# Patient Record
Sex: FEMALE | Race: OTHER | HISPANIC OR LATINO | URBAN - METROPOLITAN AREA
[De-identification: names, ages, dates, MRNs, and addresses within clinical notes are randomized per-mention and may not be internally consistent; named-entity substitution may affect disease eponyms.]

---

## 2017-01-10 ENCOUNTER — INPATIENT (INPATIENT)
Facility: HOSPITAL | Age: 37
LOS: 2 days | Discharge: HOME | DRG: 342 | End: 2017-01-13
Attending: SURGERY | Admitting: SURGERY
Payer: MEDICAID

## 2017-01-10 VITALS — HEIGHT: 59 IN | WEIGHT: 125 LBS

## 2017-01-10 LAB
ALBUMIN SERPL ELPH-MCNC: 3.8 G/DL — SIGNIFICANT CHANGE UP (ref 3.3–5)
ALP SERPL-CCNC: 98 U/L — SIGNIFICANT CHANGE UP (ref 40–120)
ALT FLD-CCNC: 29 U/L RC — SIGNIFICANT CHANGE UP (ref 10–45)
ANION GAP SERPL CALC-SCNC: 18 MMOL/L — HIGH (ref 5–17)
APPEARANCE UR: ABNORMAL
APTT BLD: 35.7 SEC — SIGNIFICANT CHANGE UP (ref 27.5–37.4)
AST SERPL-CCNC: 40 U/L — SIGNIFICANT CHANGE UP (ref 10–40)
BACTERIA # UR AUTO: ABNORMAL /HPF
BASOPHILS # BLD AUTO: 0 K/UL — SIGNIFICANT CHANGE UP (ref 0–0.2)
BASOPHILS NFR BLD AUTO: 0.1 % — SIGNIFICANT CHANGE UP (ref 0–2)
BILIRUB SERPL-MCNC: 0.5 MG/DL — SIGNIFICANT CHANGE UP (ref 0.2–1.2)
BILIRUB UR-MCNC: NEGATIVE — SIGNIFICANT CHANGE UP
BUN SERPL-MCNC: 10 MG/DL — SIGNIFICANT CHANGE UP (ref 7–23)
CALCIUM SERPL-MCNC: 9.4 MG/DL — SIGNIFICANT CHANGE UP (ref 8.4–10.5)
CHLORIDE SERPL-SCNC: 97 MMOL/L — SIGNIFICANT CHANGE UP (ref 96–108)
CO2 SERPL-SCNC: 22 MMOL/L — SIGNIFICANT CHANGE UP (ref 22–31)
COLOR SPEC: YELLOW — SIGNIFICANT CHANGE UP
CREAT SERPL-MCNC: 0.77 MG/DL — SIGNIFICANT CHANGE UP (ref 0.5–1.3)
DIFF PNL FLD: ABNORMAL
EOSINOPHIL # BLD AUTO: 0.3 K/UL — SIGNIFICANT CHANGE UP (ref 0–0.5)
EOSINOPHIL NFR BLD AUTO: 1.1 % — SIGNIFICANT CHANGE UP (ref 0–6)
EPI CELLS # UR: SIGNIFICANT CHANGE UP /HPF
GAS PNL BLDV: SIGNIFICANT CHANGE UP
GLUCOSE SERPL-MCNC: 134 MG/DL — HIGH (ref 70–99)
GLUCOSE UR QL: NEGATIVE — SIGNIFICANT CHANGE UP
HCG SERPL-ACNC: <2 MIU/ML — SIGNIFICANT CHANGE UP
HCG UR QL: NEGATIVE — SIGNIFICANT CHANGE UP
HCT VFR BLD CALC: 34.5 % — SIGNIFICANT CHANGE UP (ref 34.5–45)
HGB BLD-MCNC: 11.6 G/DL — SIGNIFICANT CHANGE UP (ref 11.5–15.5)
INR BLD: 1.38 RATIO — HIGH (ref 0.88–1.16)
KETONES UR-MCNC: NEGATIVE — SIGNIFICANT CHANGE UP
LEUKOCYTE ESTERASE UR-ACNC: ABNORMAL
LYMPHOCYTES # BLD AUTO: 1.5 K/UL — SIGNIFICANT CHANGE UP (ref 1–3.3)
LYMPHOCYTES # BLD AUTO: 6 % — LOW (ref 13–44)
MCHC RBC-ENTMCNC: 25.9 PG — LOW (ref 27–34)
MCHC RBC-ENTMCNC: 33.6 GM/DL — SIGNIFICANT CHANGE UP (ref 32–36)
MCV RBC AUTO: 77.1 FL — LOW (ref 80–100)
MONOCYTES # BLD AUTO: 1.6 K/UL — HIGH (ref 0–0.9)
MONOCYTES NFR BLD AUTO: 6.5 % — SIGNIFICANT CHANGE UP (ref 2–14)
NEUTROPHILS # BLD AUTO: 21.8 K/UL — HIGH (ref 1.8–7.4)
NEUTROPHILS NFR BLD AUTO: 86.2 % — HIGH (ref 43–77)
NITRITE UR-MCNC: POSITIVE
PH UR: 6 — SIGNIFICANT CHANGE UP (ref 4.8–8)
PLATELET # BLD AUTO: 270 K/UL — SIGNIFICANT CHANGE UP (ref 150–400)
POTASSIUM SERPL-MCNC: 3.8 MMOL/L — SIGNIFICANT CHANGE UP (ref 3.5–5.3)
POTASSIUM SERPL-SCNC: 3.8 MMOL/L — SIGNIFICANT CHANGE UP (ref 3.5–5.3)
PROT SERPL-MCNC: 7.8 G/DL — SIGNIFICANT CHANGE UP (ref 6–8.3)
PROT UR-MCNC: 30 MG/DL
PROTHROM AB SERPL-ACNC: 15.1 SEC — HIGH (ref 10–13.1)
RBC # BLD: 4.48 M/UL — SIGNIFICANT CHANGE UP (ref 3.8–5.2)
RBC # FLD: 15.5 % — HIGH (ref 10.3–14.5)
RBC CASTS # UR COMP ASSIST: SIGNIFICANT CHANGE UP /HPF (ref 0–2)
SODIUM SERPL-SCNC: 137 MMOL/L — SIGNIFICANT CHANGE UP (ref 135–145)
SP GR SPEC: 1.02 — SIGNIFICANT CHANGE UP (ref 1.01–1.02)
UROBILINOGEN FLD QL: NEGATIVE — SIGNIFICANT CHANGE UP
WBC # BLD: 25.3 K/UL — HIGH (ref 3.8–10.5)
WBC # FLD AUTO: 25.3 K/UL — HIGH (ref 3.8–10.5)
WBC UR QL: SIGNIFICANT CHANGE UP /HPF (ref 0–5)

## 2017-01-10 PROCEDURE — 99285 EMERGENCY DEPT VISIT HI MDM: CPT

## 2017-01-10 PROCEDURE — 74177 CT ABD & PELVIS W/CONTRAST: CPT | Mod: 26

## 2017-01-10 PROCEDURE — 71010: CPT | Mod: 26

## 2017-01-10 RX ORDER — ACETAMINOPHEN 500 MG
1000 TABLET ORAL ONCE
Refills: 0 | Status: COMPLETED | OUTPATIENT
Start: 2017-01-10 | End: 2017-01-10

## 2017-01-10 RX ORDER — ONDANSETRON 8 MG/1
4 TABLET, FILM COATED ORAL ONCE
Refills: 0 | Status: COMPLETED | OUTPATIENT
Start: 2017-01-10 | End: 2017-01-10

## 2017-01-10 RX ORDER — MORPHINE SULFATE 50 MG/1
4 CAPSULE, EXTENDED RELEASE ORAL ONCE
Refills: 0 | Status: DISCONTINUED | OUTPATIENT
Start: 2017-01-10 | End: 2017-01-10

## 2017-01-10 RX ORDER — SODIUM CHLORIDE 9 MG/ML
1000 INJECTION INTRAMUSCULAR; INTRAVENOUS; SUBCUTANEOUS ONCE
Refills: 0 | Status: COMPLETED | OUTPATIENT
Start: 2017-01-10 | End: 2017-01-10

## 2017-01-10 RX ORDER — CEFTRIAXONE 500 MG/1
1 INJECTION, POWDER, FOR SOLUTION INTRAMUSCULAR; INTRAVENOUS ONCE
Refills: 0 | Status: COMPLETED | OUTPATIENT
Start: 2017-01-10 | End: 2017-01-10

## 2017-01-10 RX ADMIN — SODIUM CHLORIDE 1000 MILLILITER(S): 9 INJECTION INTRAMUSCULAR; INTRAVENOUS; SUBCUTANEOUS at 20:44

## 2017-01-10 RX ADMIN — MORPHINE SULFATE 4 MILLIGRAM(S): 50 CAPSULE, EXTENDED RELEASE ORAL at 21:30

## 2017-01-10 RX ADMIN — MORPHINE SULFATE 4 MILLIGRAM(S): 50 CAPSULE, EXTENDED RELEASE ORAL at 20:46

## 2017-01-10 RX ADMIN — Medication 400 MILLIGRAM(S): at 21:29

## 2017-01-10 RX ADMIN — CEFTRIAXONE 100 GRAM(S): 500 INJECTION, POWDER, FOR SOLUTION INTRAMUSCULAR; INTRAVENOUS at 21:58

## 2017-01-10 RX ADMIN — ONDANSETRON 4 MILLIGRAM(S): 8 TABLET, FILM COATED ORAL at 20:45

## 2017-01-10 NOTE — ED PROVIDER NOTE - ATTENDING CONTRIBUTION TO CARE
Patient presenting c/o multiple days of worsening abdominal pain and fevers.  Associated nausea, decreased appetite.  No prior surgeries.  Localizing pain primarily to RLQ.    On exam patient tachycardic, febrile, unwell but non toxic appearing, lungs clear.  Abdomen TTP RUQ/RLQ/LLQ, + rebound, + R CVA TTP.    DDx:  Appendicitis/pyelonephritis/infected renal stone - labs, antipyretics, IVF, pain control, UA, CT A/P

## 2017-01-10 NOTE — ED PROVIDER NOTE - OBJECTIVE STATEMENT
40y Female no PMH no PSH on abdomen complaining of RLQ abdominal pain x 4 days. Starting Friday, started having abdominal pain. Associated with vomiting and constipation.  Also having back pain. Pain worsens with movement. +tactile fevers since saturday. Feeling generally weak. Has been preventing her from working, caused her to present here today. Denies diarrhea, chest pain, or dyspnea.     LMP: 12/16/2017 40y Female no PMH no PSH on abdomen complaining of RLQ/RUQ abdominal pain +R flank pain x 4 days. Starting Friday, started having abdominal pain. Associated with vomiting and constipation.  Also having back pain, bilateral upper and Right lower back. Pain worsens with movement. +tactile fevers since saturday. Feeling generally weak. Has been preventing her from working, caused her to present here today. Denies diarrhea, chest pain, or dyspnea. +dysuria.    LMP: 12/16/2017 40y Female no PMH no PSH on abdomen complaining of RLQ/RUQ abdominal pain +R flank pain x 4 days. Starting Friday, started having abdominal pain. Associated with vomiting and constipation.  Also having back pain, bilateral upper and Right lower back. Pain worsens with movement. +tactile fevers since saturday. Feeling generally weak. Has been preventing her from working, caused her to present here today. Denies diarrhea, chest pain, or dyspnea. +dysuria.    no PCP  LMP: 12/16/2017

## 2017-01-10 NOTE — ED ADULT NURSE NOTE - OBJECTIVE STATEMENT
39 y/o female pt presents to ED c/o RLQ abd pain radiating to R lower back x 5 days. Pt states vomited yesterday, has not vomited today, +nausea, +dysuria, states has seen "pus" in urine. +Chills, febrile rectally in ED. States last BM was five days ago, normal for pt, denies diarrhea. No hx of abd surgeries. Abd tender to palpation in RLQ, nondistended, skin warm dry and intact. Pt in no acute distress.

## 2017-01-10 NOTE — ED PROVIDER NOTE - PHYSICAL EXAMINATION
ROS: GENERAL: no fevers, no chills HEENT: no epistaxis, no eye pain, no ear, no throat pain CARDIAC: no chest pain, no shortness of breath PULM: no cough, no shortness of breath GI: no nausea, no vomiting, no diarrhea, no abdominal pain, no hematemesis, no bright red blood per rectum : no dysuria, no hematuria EXTREMITIES: no arm pain, no leg pain, no back pain SKIN: no purpura, no petechiae NEURO: no headache, no neck pain, no loss of strength/sensation HEME: no easy bruising, no easy bleeding     PE: CONSTITUTIONAL: Well appearing, well nourished, in no apparent distress. ENMT: Airway patent, nasal mucosa clear, mouth with normal mucosa. HEAD: NCAT EYES: PERRL, EOMI CARDIAC: RRR, no m/r/g, no pedal edema RESPIRATORY: CTA b/l, no adventitious sounds GI: Abdomen non-distended, +TTP of RLQ MSK: Spine appears normal, range of motion is not limited, no muscle/joint tenderness NEURO: CNII-XII grossly intact, 5/5 strength, full sensation all extremities, gait intact SKIN: Skin tone normal in color, warm and dry. No evidence of rash. ROS: GENERAL: + fevers, no chills HEENT: no epistaxis, no eye pain, no ear, no throat pain CARDIAC: no chest pain, no shortness of breath PULM: no cough, no shortness of breath GI: + nausea, + vomiting, no diarrhea, +constipation, + abdominal pain, no hematemesis, no bright red blood per rectum : + dysuria, no hematuria EXTREMITIES: no arm pain, no leg pain, + back pain, +flank pain SKIN: no purpura, no petechiae NEURO: no headache, no neck pain, no loss of strength/sensation HEME: no easy bruising, no easy bleeding     PE: CONSTITUTIONAL: Nontoxic, in mild apparent distress. ENMT: Airway patent, nasal mucosa clear, mouth with normal mucosa. HEAD: NCAT EYES: PERRL, EOMI CARDIAC: RRR, no m/r/g, no pedal edema RESPIRATORY: CTA b/l, no adventitious sounds GI: Abdomen non-distended, +TTP of RLQ, RUQ, +rebound TTP, +Pérez's sign, +R CVAT MSK: Spine appears normal, range of motion is not limited, no muscle/joint tenderness NEURO: CNII-XII grossly intact, 5/5 strength, full sensation all extremities, gait intact SKIN: Skin tone normal in color, warm and dry. No evidence of rash. ROS: GENERAL: + fevers, no chills HEENT: no epistaxis, no eye pain, no ear, no throat pain CARDIAC: no chest pain, no shortness of breath PULM: no cough, no shortness of breath GI: + nausea, + vomiting, no diarrhea, +constipation, + abdominal pain, no hematemesis, no bright red blood per rectum : + dysuria, no hematuria EXTREMITIES: no arm pain, no leg pain, + back pain, +flank pain SKIN: no purpura, no petechiae NEURO: no headache, no neck pain, no loss of strength/sensation HEME: no easy bruising, no easy bleeding     PE: CONSTITUTIONAL: Nontoxic, in mild apparent distress. ENMT: Airway patent, nasal mucosa clear, mouth with normal mucosa. HEAD: NCAT EYES: PERRL, EOMI CARDIAC: RRR, no m/r/g, no pedal edema RESPIRATORY: CTA b/l, no adventitious sounds GI: Abdomen distended, +TTP of RLQ, RUQ, +rebound TTP, +Pérez's sign, +R CVAT MSK: Spine appears normal, range of motion is not limited, +bilateral upper back pain NEURO: CNII-XII grossly intact, 5/5 strength, full sensation all extremities, gait intact SKIN: Skin tone normal in color, warm and dry. No evidence of rash.

## 2017-01-11 DIAGNOSIS — Z98.891 HISTORY OF UTERINE SCAR FROM PREVIOUS SURGERY: Chronic | ICD-10-CM

## 2017-01-11 DIAGNOSIS — R10.9 UNSPECIFIED ABDOMINAL PAIN: ICD-10-CM

## 2017-01-11 LAB — RH IG SCN BLD-IMP: POSITIVE — SIGNIFICANT CHANGE UP

## 2017-01-11 PROCEDURE — 88304 TISSUE EXAM BY PATHOLOGIST: CPT | Mod: 26

## 2017-01-11 PROCEDURE — 78227 HEPATOBIL SYST IMAGE W/DRUG: CPT | Mod: 26

## 2017-01-11 PROCEDURE — 44970 LAPAROSCOPY APPENDECTOMY: CPT | Mod: 59

## 2017-01-11 PROCEDURE — 47562 LAPAROSCOPIC CHOLECYSTECTOMY: CPT

## 2017-01-11 RX ORDER — MORPHINE SULFATE 50 MG/1
4 CAPSULE, EXTENDED RELEASE ORAL EVERY 4 HOURS
Refills: 0 | Status: DISCONTINUED | OUTPATIENT
Start: 2017-01-11 | End: 2017-01-11

## 2017-01-11 RX ORDER — PIPERACILLIN AND TAZOBACTAM 4; .5 G/20ML; G/20ML
3.38 INJECTION, POWDER, LYOPHILIZED, FOR SOLUTION INTRAVENOUS ONCE
Refills: 0 | Status: COMPLETED | OUTPATIENT
Start: 2017-01-11 | End: 2017-01-11

## 2017-01-11 RX ORDER — HEPARIN SODIUM 5000 [USP'U]/ML
5000 INJECTION INTRAVENOUS; SUBCUTANEOUS EVERY 8 HOURS
Refills: 0 | Status: DISCONTINUED | OUTPATIENT
Start: 2017-01-11 | End: 2017-01-13

## 2017-01-11 RX ORDER — SODIUM CHLORIDE 9 MG/ML
1000 INJECTION, SOLUTION INTRAVENOUS
Refills: 0 | Status: DISCONTINUED | OUTPATIENT
Start: 2017-01-11 | End: 2017-01-12

## 2017-01-11 RX ORDER — PIPERACILLIN AND TAZOBACTAM 4; .5 G/20ML; G/20ML
3.38 INJECTION, POWDER, LYOPHILIZED, FOR SOLUTION INTRAVENOUS EVERY 8 HOURS
Refills: 0 | Status: COMPLETED | OUTPATIENT
Start: 2017-01-11 | End: 2017-01-12

## 2017-01-11 RX ORDER — HYDROMORPHONE HYDROCHLORIDE 2 MG/ML
0.5 INJECTION INTRAMUSCULAR; INTRAVENOUS; SUBCUTANEOUS
Refills: 0 | Status: DISCONTINUED | OUTPATIENT
Start: 2017-01-11 | End: 2017-01-11

## 2017-01-11 RX ORDER — ACETAMINOPHEN 500 MG
975 TABLET ORAL ONCE
Refills: 0 | Status: COMPLETED | OUTPATIENT
Start: 2017-01-11 | End: 2017-01-11

## 2017-01-11 RX ORDER — PIPERACILLIN AND TAZOBACTAM 4; .5 G/20ML; G/20ML
3.38 INJECTION, POWDER, LYOPHILIZED, FOR SOLUTION INTRAVENOUS EVERY 8 HOURS
Refills: 0 | Status: DISCONTINUED | OUTPATIENT
Start: 2017-01-11 | End: 2017-01-11

## 2017-01-11 RX ORDER — ENOXAPARIN SODIUM 100 MG/ML
40 INJECTION SUBCUTANEOUS DAILY
Refills: 0 | Status: DISCONTINUED | OUTPATIENT
Start: 2017-01-11 | End: 2017-01-11

## 2017-01-11 RX ORDER — SODIUM CHLORIDE 9 MG/ML
1000 INJECTION, SOLUTION INTRAVENOUS
Refills: 0 | Status: DISCONTINUED | OUTPATIENT
Start: 2017-01-11 | End: 2017-01-11

## 2017-01-11 RX ORDER — ONDANSETRON 8 MG/1
4 TABLET, FILM COATED ORAL ONCE
Refills: 0 | Status: DISCONTINUED | OUTPATIENT
Start: 2017-01-11 | End: 2017-01-11

## 2017-01-11 RX ORDER — MORPHINE SULFATE 50 MG/1
2 CAPSULE, EXTENDED RELEASE ORAL
Refills: 0 | Status: DISCONTINUED | OUTPATIENT
Start: 2017-01-11 | End: 2017-01-12

## 2017-01-11 RX ORDER — MORPHINE SULFATE 50 MG/1
2 CAPSULE, EXTENDED RELEASE ORAL EVERY 4 HOURS
Refills: 0 | Status: DISCONTINUED | OUTPATIENT
Start: 2017-01-11 | End: 2017-01-11

## 2017-01-11 RX ADMIN — PIPERACILLIN AND TAZOBACTAM 25 GRAM(S): 4; .5 INJECTION, POWDER, LYOPHILIZED, FOR SOLUTION INTRAVENOUS at 06:10

## 2017-01-11 RX ADMIN — HEPARIN SODIUM 5000 UNIT(S): 5000 INJECTION INTRAVENOUS; SUBCUTANEOUS at 21:41

## 2017-01-11 RX ADMIN — Medication 975 MILLIGRAM(S): at 04:38

## 2017-01-11 RX ADMIN — PIPERACILLIN AND TAZOBACTAM 25 GRAM(S): 4; .5 INJECTION, POWDER, LYOPHILIZED, FOR SOLUTION INTRAVENOUS at 21:41

## 2017-01-11 RX ADMIN — PIPERACILLIN AND TAZOBACTAM 200 GRAM(S): 4; .5 INJECTION, POWDER, LYOPHILIZED, FOR SOLUTION INTRAVENOUS at 03:15

## 2017-01-11 RX ADMIN — SODIUM CHLORIDE 100 MILLILITER(S): 9 INJECTION, SOLUTION INTRAVENOUS at 18:08

## 2017-01-11 NOTE — BRIEF OPERATIVE NOTE - PROCEDURE
Umbilical hernia repair  01/11/2017    Active  AGODWIN  Appendectomy, laparoscopic  01/11/2017    Active  AGODWIN  Cholecystectomy, laparoscopic  01/11/2017    Active  AGODWIN

## 2017-01-11 NOTE — H&P ADULT. - ASSESSMENT
39 y/o female with abdominal pain with acute cholecystitis, with possible concomitant acute appendicitis    -Admit to Surgery, Dr. Davis  -NPO  -IVF  -Zosyn  -OOB  -HIDA to confirm acute cholecystitis   -DVT PPX

## 2017-01-11 NOTE — BRIEF OPERATIVE NOTE - OPERATION/FINDINGS
Patient underwent laparoscopic cholecystectomy without any complications. Gallbladder very inflamed, edematous, and gangrenous had to under go needle decompression before dissection began. Dense adhesion involving gallbladder, liver, and duodenum underwent careful adhesiolysis without any complications. Critical view obtained and cystic duct and artery identified then underwent clipping then divided. We then turned our attention to the appendix, which was also engorged and inflamed. Mesoappendix divided with ligasure electrocautery and appendix divided with EndoGIA (45 blue load). Patient also had primary repair of umbilical hernia.

## 2017-01-11 NOTE — BRIEF OPERATIVE NOTE - PRE-OP DX
Acute appendicitis with generalized peritonitis  01/11/2017    Chepe Craven  Calculus of gallbladder with acute cholecystitis without obstruction  01/11/2017    Active  Chepe Ruano

## 2017-01-11 NOTE — ED ADULT NURSE REASSESSMENT NOTE - NS ED NURSE REASSESS COMMENT FT1
received call from blood bank that type & screen was hemolyzed, pt in transport, receiving RN on 2 [facility] called and notified

## 2017-01-11 NOTE — BRIEF OPERATIVE NOTE - POST-OP DX
Calculus of gallbladder with acute cholecystitis without obstruction  01/11/2017    Chepe Craven  Other acute appendicitis  01/11/2017    Chepe Craven  Umbilical hernia without obstruction and without gangrene  01/11/2017    Chepe Craven

## 2017-01-12 LAB
-  AMIKACIN: SIGNIFICANT CHANGE UP
-  AMPICILLIN/SULBACTAM: SIGNIFICANT CHANGE UP
-  AMPICILLIN: SIGNIFICANT CHANGE UP
-  AZTREONAM: SIGNIFICANT CHANGE UP
-  CEFAZOLIN: SIGNIFICANT CHANGE UP
-  CEFEPIME: SIGNIFICANT CHANGE UP
-  CEFOXITIN: SIGNIFICANT CHANGE UP
-  CEFTAZIDIME: SIGNIFICANT CHANGE UP
-  CEFTRIAXONE: SIGNIFICANT CHANGE UP
-  CIPROFLOXACIN: SIGNIFICANT CHANGE UP
-  ERTAPENEM: SIGNIFICANT CHANGE UP
-  GENTAMICIN: SIGNIFICANT CHANGE UP
-  IMIPENEM: SIGNIFICANT CHANGE UP
-  LEVOFLOXACIN: SIGNIFICANT CHANGE UP
-  MEROPENEM: SIGNIFICANT CHANGE UP
-  NITROFURANTOIN: SIGNIFICANT CHANGE UP
-  PIPERACILLIN/TAZOBACTAM: SIGNIFICANT CHANGE UP
-  TOBRAMYCIN: SIGNIFICANT CHANGE UP
-  TRIMETHOPRIM/SULFAMETHOXAZOLE: SIGNIFICANT CHANGE UP
ALBUMIN SERPL ELPH-MCNC: 2.8 G/DL — LOW (ref 3.3–5)
ALP SERPL-CCNC: 158 U/L — HIGH (ref 40–120)
ALT FLD-CCNC: 31 U/L — SIGNIFICANT CHANGE UP (ref 10–45)
ANION GAP SERPL CALC-SCNC: 12 MMOL/L — SIGNIFICANT CHANGE UP (ref 5–17)
AST SERPL-CCNC: 34 U/L — SIGNIFICANT CHANGE UP (ref 10–40)
BILIRUB SERPL-MCNC: 0.2 MG/DL — SIGNIFICANT CHANGE UP (ref 0.2–1.2)
BUN SERPL-MCNC: 9 MG/DL — SIGNIFICANT CHANGE UP (ref 7–23)
CALCIUM SERPL-MCNC: 8 MG/DL — LOW (ref 8.4–10.5)
CHLORIDE SERPL-SCNC: 101 MMOL/L — SIGNIFICANT CHANGE UP (ref 96–108)
CO2 SERPL-SCNC: 26 MMOL/L — SIGNIFICANT CHANGE UP (ref 22–31)
CREAT SERPL-MCNC: 0.51 MG/DL — SIGNIFICANT CHANGE UP (ref 0.5–1.3)
CULTURE RESULTS: SIGNIFICANT CHANGE UP
GLUCOSE SERPL-MCNC: 114 MG/DL — HIGH (ref 70–99)
HCT VFR BLD CALC: 29.2 % — LOW (ref 34.5–45)
HGB BLD-MCNC: 9.3 G/DL — LOW (ref 11.5–15.5)
MCHC RBC-ENTMCNC: 24.8 PG — LOW (ref 27–34)
MCHC RBC-ENTMCNC: 31.8 GM/DL — LOW (ref 32–36)
MCV RBC AUTO: 77.9 FL — LOW (ref 80–100)
METHOD TYPE: SIGNIFICANT CHANGE UP
ORGANISM # SPEC MICROSCOPIC CNT: SIGNIFICANT CHANGE UP
ORGANISM # SPEC MICROSCOPIC CNT: SIGNIFICANT CHANGE UP
PLATELET # BLD AUTO: 301 K/UL — SIGNIFICANT CHANGE UP (ref 150–400)
POTASSIUM SERPL-MCNC: 4.1 MMOL/L — SIGNIFICANT CHANGE UP (ref 3.5–5.3)
POTASSIUM SERPL-SCNC: 4.1 MMOL/L — SIGNIFICANT CHANGE UP (ref 3.5–5.3)
PROT SERPL-MCNC: 6.3 G/DL — SIGNIFICANT CHANGE UP (ref 6–8.3)
RBC # BLD: 3.75 M/UL — LOW (ref 3.8–5.2)
RBC # FLD: 17.1 % — HIGH (ref 10.3–14.5)
SODIUM SERPL-SCNC: 139 MMOL/L — SIGNIFICANT CHANGE UP (ref 135–145)
SPECIMEN SOURCE: SIGNIFICANT CHANGE UP
WBC # BLD: 16.79 K/UL — HIGH (ref 3.8–10.5)
WBC # FLD AUTO: 16.79 K/UL — HIGH (ref 3.8–10.5)

## 2017-01-12 RX ORDER — SODIUM CHLORIDE 9 MG/ML
500 INJECTION, SOLUTION INTRAVENOUS ONCE
Refills: 0 | Status: COMPLETED | OUTPATIENT
Start: 2017-01-12 | End: 2017-01-12

## 2017-01-12 RX ORDER — PIPERACILLIN AND TAZOBACTAM 4; .5 G/20ML; G/20ML
3.38 INJECTION, POWDER, LYOPHILIZED, FOR SOLUTION INTRAVENOUS ONCE
Refills: 0 | Status: COMPLETED | OUTPATIENT
Start: 2017-01-12 | End: 2017-01-12

## 2017-01-12 RX ORDER — OXYCODONE HYDROCHLORIDE 5 MG/1
1 TABLET ORAL
Qty: 20 | Refills: 0
Start: 2017-01-12

## 2017-01-12 RX ORDER — PIPERACILLIN AND TAZOBACTAM 4; .5 G/20ML; G/20ML
3.38 INJECTION, POWDER, LYOPHILIZED, FOR SOLUTION INTRAVENOUS EVERY 8 HOURS
Refills: 0 | Status: DISCONTINUED | OUTPATIENT
Start: 2017-01-12 | End: 2017-01-13

## 2017-01-12 RX ADMIN — PIPERACILLIN AND TAZOBACTAM 25 GRAM(S): 4; .5 INJECTION, POWDER, LYOPHILIZED, FOR SOLUTION INTRAVENOUS at 14:40

## 2017-01-12 RX ADMIN — PIPERACILLIN AND TAZOBACTAM 200 GRAM(S): 4; .5 INJECTION, POWDER, LYOPHILIZED, FOR SOLUTION INTRAVENOUS at 21:21

## 2017-01-12 RX ADMIN — SODIUM CHLORIDE 100 MILLILITER(S): 9 INJECTION, SOLUTION INTRAVENOUS at 10:50

## 2017-01-12 RX ADMIN — HEPARIN SODIUM 5000 UNIT(S): 5000 INJECTION INTRAVENOUS; SUBCUTANEOUS at 05:21

## 2017-01-12 RX ADMIN — PIPERACILLIN AND TAZOBACTAM 25 GRAM(S): 4; .5 INJECTION, POWDER, LYOPHILIZED, FOR SOLUTION INTRAVENOUS at 05:21

## 2017-01-12 RX ADMIN — HEPARIN SODIUM 5000 UNIT(S): 5000 INJECTION INTRAVENOUS; SUBCUTANEOUS at 14:43

## 2017-01-12 RX ADMIN — SODIUM CHLORIDE 1000 MILLILITER(S): 9 INJECTION, SOLUTION INTRAVENOUS at 01:43

## 2017-01-12 RX ADMIN — HEPARIN SODIUM 5000 UNIT(S): 5000 INJECTION INTRAVENOUS; SUBCUTANEOUS at 21:14

## 2017-01-12 NOTE — DISCHARGE NOTE ADULT - INSTRUCTIONS
Low fat diet  Activity as tolerated  You may shower. Pat Dry abdomen. Leave the white steri strips in place, they will fall off on their own in approximately 5-7 days.   NOTIFY YOUR SURGEON IF: You have any bleeding that does not stop, any pus draining from your wound, any fever (over 100.4 F) or chills, persistent nausea/vomiting, persistent diarrhea, or if your pain is not controlled on your discharge pain medications.

## 2017-01-12 NOTE — DISCHARGE NOTE ADULT - HOSPITAL COURSE
40 F with no significant PMH or PSH presented on 1/11/17 with four days of acute onset RUQ, RLQ, and right flank pain. She stated the pain had been constant with occasional radiation tot he back, associated with nausea and vomiting, fevers, and chills. She also complained of dysura.  Patient had a + UA in the ED  CT scan significant for signs of acute cholecystitis as well as acute appendicitis.  The patient was admitted and made NPO with IVF. She was started ib zosyn for her infections.  A HIDA scan was performed and resulted as abnormal with signs of acute cholecystitis. The patient was taken to the operating room on 1/11 where a laparoscopic appendectomy, laparoscopic cholecystectomy, and umbilical hernia repair was performed. The patient tolerated the procedure well and was sent to the floor in stable condition. Her pain was well controlled and she tolerated a clear liquid diet on post operative day 1.   She was discharged home on post operative day 2 having bowel function, ambulating, tolerating a regular diet, with pain well controlled with instructions for followup.

## 2017-01-12 NOTE — DISCHARGE NOTE ADULT - CARE PROVIDERS DIRECT ADDRESSES
[email or web address]

## 2017-01-12 NOTE — DISCHARGE NOTE ADULT - ADDITIONAL INSTRUCTIONS
1. Dr. Gonzalez  2. Please call for a follow up appointment with your primary care medical doctor upon discharge regarding your recent surgery and hospitalization.

## 2017-01-12 NOTE — PROVIDER CONTACT NOTE (OTHER) - BACKGROUND
Pt admitted for acute choly and acute appy, secondary UTI. Pt is on a clear diet and started voiding at 12:30 am. PACU VS were stable. Approximately around 1:30 am, pt was experiencing hypotension.

## 2017-01-12 NOTE — DISCHARGE NOTE ADULT - PATIENT PORTAL LINK FT
“You can access the [facility] Patient Portal, offered by [facility], by registering with the following website: [email or web address]”

## 2017-01-12 NOTE — PROVIDER CONTACT NOTE (OTHER) - RECOMMENDATIONS
Antwan recommended giving Chavez 500mL bolus of LR. Elevated the head of the bed. Pt blood pressure stabilized.

## 2017-01-12 NOTE — DISCHARGE NOTE ADULT - CARE PROVIDER_API CALL
Dannielle Gonzalez), Surgery  Critical Care  [address] [place] [address]  Phone: [phone]  Fax: [phone]

## 2017-01-12 NOTE — DISCHARGE NOTE ADULT - CARE PLAN
Principal Discharge DX:	Acute appendicitis  Goal:	Obtain proper follow up  Instructions for follow-up, activity and diet:	Please follow up with Dr. Gonzalez in the office in one to two weeks. You may resume a regular diet. You were prescribed pain medicine to [facility] in [facility]. You may take it as needed. Do not drive while taking pain medications. Take tylenol and motrin for mild pain. Return to the hospital if you  notice increased pain, high fevers, or vomiting.  Secondary Diagnosis:	Acute cholecystitis  Goal:	Obtain proper follow up  Secondary Diagnosis:	Umbilical hernia  Goal:	Obtain proper follow up Principal Discharge DX:	Acute appendicitis  Goal:	Obtain proper follow up  Instructions for follow-up, activity and diet:	Please follow up with Dr. Gonzalez in the office in one to two weeks. You may resume a regular diet. You were prescribed pain medicine to [facility] in [facility]. You may take it as needed. Do not drive while taking pain medications. Take tylenol and motrin for mild pain. Return to the hospital if you  notice increased pain, high fevers, or vomiting.  Secondary Diagnosis:	Acute cholecystitis  Goal:	Obtain proper follow up  Secondary Diagnosis:	Umbilical hernia  Goal:	Obtain proper follow up Principal Discharge DX:	Acute appendicitis  Goal:	Obtain proper follow up  Instructions for follow-up, activity and diet:	Please follow up with Dr. Gonzalez in the office in one to two weeks. You may resume a regular diet. You were prescribed pain medicine to [facility] in [facility]. You may take it as needed. Do not drive while taking pain medications. Take tylenol and motrin for mild pain. Return to the hospital if you  notice increased pain, high fevers, or vomiting.  Secondary Diagnosis:	Acute cholecystitis  Goal:	Obtain proper follow up  Secondary Diagnosis:	Umbilical hernia  Goal:	Obtain proper follow up Principal Discharge DX:	Acute appendicitis  Goal:	Obtain proper follow up  Instructions for follow-up, activity and diet:	Please follow up with Dr. Gonzalez in the office in one to two weeks. You may resume a regular diet. You were prescribed pain medicine to [facility] in [facility]. You may take it as needed. Do not drive while taking pain medications. Take tylenol and motrin for mild pain. Return to the hospital if you  notice increased pain, high fevers, or vomiting.  Secondary Diagnosis:	Acute cholecystitis  Goal:	Obtain proper follow up  Secondary Diagnosis:	Umbilical hernia  Goal:	Obtain proper follow up

## 2017-01-12 NOTE — PROVIDER CONTACT NOTE (OTHER) - SITUATION
Pt experienced hypotensive episode of 80/53. Rechecked blood pressure three times and each set of vitals was in the systolic 80's.

## 2017-01-12 NOTE — DISCHARGE NOTE ADULT - PLAN OF CARE
Obtain proper follow up Please follow up with Dr. Gonzalez in the office in one to two weeks. You may resume a regular diet. You were prescribed pain medicine to [facility] in [facility]. You may take it as needed. Do not drive while taking pain medications. Take tylenol and motrin for mild pain. Return to the hospital if you  notice increased pain, high fevers, or vomiting.

## 2017-01-12 NOTE — PROVIDER CONTACT NOTE (OTHER) - ASSESSMENT
Pt's first set of blood pressure was 84/54. Second set was 80/53. Pt was currently receiving LR at 100ml/hr. notified MD Moncada.

## 2017-01-12 NOTE — DISCHARGE NOTE ADULT - MEDICATION SUMMARY - MEDICATIONS TO TAKE
I will START or STAY ON the medications listed below when I get home from the hospital:    acetaminophen-oxyCODONE 325 mg-5 mg oral tablet  -- 1-2 tab(s) by mouth every 4 hours, As Needed, -for moderate pain MDD:6  -- Indication: For pain

## 2017-01-13 VITALS
SYSTOLIC BLOOD PRESSURE: 103 MMHG | OXYGEN SATURATION: 97 % | DIASTOLIC BLOOD PRESSURE: 66 MMHG | RESPIRATION RATE: 18 BRPM | HEART RATE: 71 BPM | TEMPERATURE: 98 F

## 2017-01-13 LAB
HCT VFR BLD CALC: 28.3 % — LOW (ref 34.5–45)
HGB BLD-MCNC: 8.8 G/DL — LOW (ref 11.5–15.5)
MCHC RBC-ENTMCNC: 24.5 PG — LOW (ref 27–34)
MCHC RBC-ENTMCNC: 31.1 GM/DL — LOW (ref 32–36)
MCV RBC AUTO: 78.8 FL — LOW (ref 80–100)
PLATELET # BLD AUTO: 327 K/UL — SIGNIFICANT CHANGE UP (ref 150–400)
RBC # BLD: 3.59 M/UL — LOW (ref 3.8–5.2)
RBC # FLD: 17.1 % — HIGH (ref 10.3–14.5)
WBC # BLD: 7.98 K/UL — SIGNIFICANT CHANGE UP (ref 3.8–10.5)
WBC # FLD AUTO: 7.98 K/UL — SIGNIFICANT CHANGE UP (ref 3.8–10.5)

## 2017-01-13 RX ORDER — INFLUENZA VIRUS VACCINE 15; 15; 15; 15 UG/.5ML; UG/.5ML; UG/.5ML; UG/.5ML
0.5 SUSPENSION INTRAMUSCULAR ONCE
Refills: 0 | Status: DISCONTINUED | OUTPATIENT
Start: 2017-01-13 | End: 2017-01-13

## 2017-01-13 RX ADMIN — PIPERACILLIN AND TAZOBACTAM 25 GRAM(S): 4; .5 INJECTION, POWDER, LYOPHILIZED, FOR SOLUTION INTRAVENOUS at 01:01

## 2017-01-13 RX ADMIN — HEPARIN SODIUM 5000 UNIT(S): 5000 INJECTION INTRAVENOUS; SUBCUTANEOUS at 05:06

## 2017-01-13 RX ADMIN — PIPERACILLIN AND TAZOBACTAM 25 GRAM(S): 4; .5 INJECTION, POWDER, LYOPHILIZED, FOR SOLUTION INTRAVENOUS at 11:42

## 2017-01-19 LAB — SURGICAL PATHOLOGY STUDY: SIGNIFICANT CHANGE UP

## 2017-01-31 ENCOUNTER — FORM ENCOUNTER (OUTPATIENT)
Age: 37
End: 2017-01-31

## 2017-01-31 ENCOUNTER — RESULT CHARGE (OUTPATIENT)
Age: 37
End: 2017-01-31

## 2017-01-31 ENCOUNTER — OUTPATIENT (OUTPATIENT)
Dept: OUTPATIENT SERVICES | Facility: HOSPITAL | Age: 37
LOS: 1 days | End: 2017-01-31
Payer: SELF-PAY

## 2017-01-31 ENCOUNTER — LABORATORY RESULT (OUTPATIENT)
Age: 37
End: 2017-01-31

## 2017-01-31 ENCOUNTER — APPOINTMENT (OUTPATIENT)
Dept: INTERNAL MEDICINE | Facility: CLINIC | Age: 37
End: 2017-01-31

## 2017-01-31 VITALS
SYSTOLIC BLOOD PRESSURE: 115 MMHG | BODY MASS INDEX: 20.96 KG/M2 | WEIGHT: 104 LBS | HEIGHT: 59 IN | DIASTOLIC BLOOD PRESSURE: 70 MMHG

## 2017-01-31 DIAGNOSIS — Z98.891 HISTORY OF UTERINE SCAR FROM PREVIOUS SURGERY: Chronic | ICD-10-CM

## 2017-01-31 DIAGNOSIS — I10 ESSENTIAL (PRIMARY) HYPERTENSION: ICD-10-CM

## 2017-01-31 DIAGNOSIS — R10.33 PERIUMBILICAL PAIN: ICD-10-CM

## 2017-01-31 DIAGNOSIS — R30.0 DYSURIA: ICD-10-CM

## 2017-01-31 DIAGNOSIS — N39.0 URINARY TRACT INFECTION, SITE NOT SPECIFIED: ICD-10-CM

## 2017-02-01 ENCOUNTER — APPOINTMENT (OUTPATIENT)
Dept: CT IMAGING | Facility: CLINIC | Age: 37
End: 2017-02-01

## 2017-02-01 ENCOUNTER — CLINICAL ADVICE (OUTPATIENT)
Age: 37
End: 2017-02-01

## 2017-02-01 DIAGNOSIS — Z00.00 ENCOUNTER FOR GENERAL ADULT MEDICAL EXAMINATION WITHOUT ABNORMAL FINDINGS: ICD-10-CM

## 2017-02-01 DIAGNOSIS — R10.33 PERIUMBILICAL PAIN: ICD-10-CM

## 2017-02-01 PROBLEM — R30.0 DYSURIA: Status: ACTIVE | Noted: 2017-02-01

## 2017-02-01 LAB
ALBUMIN SERPL ELPH-MCNC: 4.1 G/DL
ALP BLD-CCNC: 122 U/L
ALT SERPL-CCNC: 34 U/L
ANION GAP SERPL CALC-SCNC: 16 MMOL/L
AST SERPL-CCNC: 31 U/L
BILIRUB SERPL-MCNC: 0.2 MG/DL
BILIRUB UR QL STRIP: NORMAL
BUN SERPL-MCNC: 7 MG/DL
CALCIUM SERPL-MCNC: 9.9 MG/DL
CHLORIDE SERPL-SCNC: 100 MMOL/L
CLARITY UR: CLEAR
CO2 SERPL-SCNC: 25 MMOL/L
COLLECTION METHOD: NORMAL
CREAT SERPL-MCNC: 0.64 MG/DL
GLUCOSE SERPL-MCNC: 114 MG/DL
GLUCOSE UR-MCNC: NORMAL
HCG UR QL: 0.2 EU/DL
HCG UR QL: NEGATIVE
HGB UR QL STRIP.AUTO: NORMAL
KETONES UR-MCNC: NORMAL
LEUKOCYTE ESTERASE UR QL STRIP: NORMAL
NITRITE UR QL STRIP: NORMAL
PH UR STRIP: 6.5
POTASSIUM SERPL-SCNC: 5 MMOL/L
PROT SERPL-MCNC: 8.2 G/DL
PROT UR STRIP-MCNC: NORMAL
QUALITY CONTROL: YES
SODIUM SERPL-SCNC: 141 MMOL/L
SP GR UR STRIP: 1.02

## 2017-02-01 PROCEDURE — 74177 CT ABD & PELVIS W/CONTRAST: CPT

## 2017-02-01 PROCEDURE — 74177 CT ABD & PELVIS W/CONTRAST: CPT | Mod: 26

## 2017-02-01 PROCEDURE — 81025 URINE PREGNANCY TEST: CPT

## 2017-02-01 PROCEDURE — G0463: CPT

## 2017-02-01 PROCEDURE — 81003 URINALYSIS AUTO W/O SCOPE: CPT

## 2017-02-03 LAB
BACTERIA UR CULT: ABNORMAL
CHOLEST SERPL-MCNC: 199 MG/DL
CHOLEST/HDLC SERPL: 7.7 RATIO
HDLC SERPL-MCNC: 26 MG/DL
LDLC SERPL CALC-MCNC: NORMAL MG/DL
TRIGL SERPL-MCNC: 431 MG/DL

## 2017-02-14 ENCOUNTER — APPOINTMENT (OUTPATIENT)
Dept: TRAUMA SURGERY | Facility: CLINIC | Age: 37
End: 2017-02-14

## 2017-02-14 VITALS
SYSTOLIC BLOOD PRESSURE: 114 MMHG | TEMPERATURE: 98.6 F | DIASTOLIC BLOOD PRESSURE: 69 MMHG | HEART RATE: 78 BPM | WEIGHT: 132 LBS | HEIGHT: 59 IN | BODY MASS INDEX: 26.61 KG/M2

## 2017-02-14 RX ORDER — MICONAZOLE NITRATE 200 MG-2 %
200 & 2 KIT VAGINAL
Qty: 1 | Refills: 0 | Status: ACTIVE | COMMUNITY
Start: 2017-02-14 | End: 1900-01-01

## 2017-05-02 ENCOUNTER — OUTPATIENT (OUTPATIENT)
Dept: OUTPATIENT SERVICES | Facility: HOSPITAL | Age: 37
LOS: 1 days | End: 2017-05-02
Payer: SELF-PAY

## 2017-05-02 ENCOUNTER — APPOINTMENT (OUTPATIENT)
Dept: GASTROENTEROLOGY | Facility: HOSPITAL | Age: 37
End: 2017-05-02

## 2017-05-02 VITALS
BODY MASS INDEX: 27.42 KG/M2 | HEART RATE: 92 BPM | WEIGHT: 136 LBS | HEIGHT: 59 IN | RESPIRATION RATE: 14 BRPM | DIASTOLIC BLOOD PRESSURE: 74 MMHG | SYSTOLIC BLOOD PRESSURE: 114 MMHG

## 2017-05-02 DIAGNOSIS — K59.00 CONSTIPATION, UNSPECIFIED: ICD-10-CM

## 2017-05-02 DIAGNOSIS — E66.3 OVERWEIGHT: ICD-10-CM

## 2017-05-02 DIAGNOSIS — Z98.891 HISTORY OF UTERINE SCAR FROM PREVIOUS SURGERY: Chronic | ICD-10-CM

## 2017-05-02 DIAGNOSIS — K52.9 NONINFECTIVE GASTROENTERITIS AND COLITIS, UNSPECIFIED: ICD-10-CM

## 2017-05-02 DIAGNOSIS — R93.3 ABNORMAL FINDINGS ON DIAGNOSTIC IMAGING OF OTHER PARTS OF DIGESTIVE TRACT: ICD-10-CM

## 2017-05-02 DIAGNOSIS — Z80.0 FAMILY HISTORY OF MALIGNANT NEOPLASM OF DIGESTIVE ORGANS: ICD-10-CM

## 2017-05-02 DIAGNOSIS — K31.9 DISEASE OF STOMACH AND DUODENUM, UNSPECIFIED: ICD-10-CM

## 2017-05-02 PROCEDURE — G0463: CPT

## 2017-05-02 RX ORDER — METRONIDAZOLE 500 MG/1
500 TABLET ORAL 3 TIMES DAILY
Qty: 21 | Refills: 0 | Status: DISCONTINUED | COMMUNITY
Start: 2017-02-02 | End: 2017-05-02

## 2017-05-02 RX ORDER — CIPROFLOXACIN HYDROCHLORIDE 500 MG/1
500 TABLET, FILM COATED ORAL
Qty: 14 | Refills: 0 | Status: DISCONTINUED | COMMUNITY
Start: 2017-01-31 | End: 2017-05-02

## 2018-04-26 ENCOUNTER — EMERGENCY (EMERGENCY)
Facility: HOSPITAL | Age: 38
LOS: 1 days | Discharge: HOME | End: 2018-04-26
Attending: EMERGENCY MEDICINE
Payer: MEDICAID

## 2018-04-26 VITALS
DIASTOLIC BLOOD PRESSURE: 79 MMHG | OXYGEN SATURATION: 98 % | SYSTOLIC BLOOD PRESSURE: 111 MMHG | RESPIRATION RATE: 18 BRPM | TEMPERATURE: 98 F | HEART RATE: 81 BPM

## 2018-04-26 VITALS
HEART RATE: 81 BPM | OXYGEN SATURATION: 99 % | DIASTOLIC BLOOD PRESSURE: 75 MMHG | SYSTOLIC BLOOD PRESSURE: 110 MMHG | RESPIRATION RATE: 16 BRPM | TEMPERATURE: 98 F

## 2018-04-26 DIAGNOSIS — Z98.891 HISTORY OF UTERINE SCAR FROM PREVIOUS SURGERY: Chronic | ICD-10-CM

## 2018-04-26 LAB
ALBUMIN SERPL ELPH-MCNC: 4.2 G/DL — SIGNIFICANT CHANGE UP (ref 3.3–5)
ALP SERPL-CCNC: 99 U/L — SIGNIFICANT CHANGE UP (ref 40–120)
ALT FLD-CCNC: 32 U/L — SIGNIFICANT CHANGE UP (ref 10–45)
ANION GAP SERPL CALC-SCNC: 12 MMOL/L — SIGNIFICANT CHANGE UP (ref 5–17)
APPEARANCE UR: CLEAR — SIGNIFICANT CHANGE UP
AST SERPL-CCNC: 27 U/L — SIGNIFICANT CHANGE UP (ref 10–40)
BASE EXCESS BLDV CALC-SCNC: 2.1 MMOL/L — HIGH (ref -2–2)
BASOPHILS # BLD AUTO: 0.1 K/UL — SIGNIFICANT CHANGE UP (ref 0–0.2)
BASOPHILS NFR BLD AUTO: 0.5 % — SIGNIFICANT CHANGE UP (ref 0–2)
BILIRUB SERPL-MCNC: 0.2 MG/DL — SIGNIFICANT CHANGE UP (ref 0.2–1.2)
BILIRUB UR-MCNC: NEGATIVE — SIGNIFICANT CHANGE UP
BUN SERPL-MCNC: 8 MG/DL — SIGNIFICANT CHANGE UP (ref 7–23)
CA-I SERPL-SCNC: 1.24 MMOL/L — SIGNIFICANT CHANGE UP (ref 1.12–1.3)
CALCIUM SERPL-MCNC: 9.3 MG/DL — SIGNIFICANT CHANGE UP (ref 8.4–10.5)
CHLORIDE BLDV-SCNC: 106 MMOL/L — SIGNIFICANT CHANGE UP (ref 96–108)
CHLORIDE SERPL-SCNC: 101 MMOL/L — SIGNIFICANT CHANGE UP (ref 96–108)
CO2 BLDV-SCNC: 30 MMOL/L — SIGNIFICANT CHANGE UP (ref 22–30)
CO2 SERPL-SCNC: 24 MMOL/L — SIGNIFICANT CHANGE UP (ref 22–31)
COLOR SPEC: SIGNIFICANT CHANGE UP
CREAT SERPL-MCNC: 0.62 MG/DL — SIGNIFICANT CHANGE UP (ref 0.5–1.3)
DIFF PNL FLD: NEGATIVE — SIGNIFICANT CHANGE UP
EOSINOPHIL # BLD AUTO: 0.2 K/UL — SIGNIFICANT CHANGE UP (ref 0–0.5)
EOSINOPHIL NFR BLD AUTO: 2 % — SIGNIFICANT CHANGE UP (ref 0–6)
EPI CELLS # UR: SIGNIFICANT CHANGE UP /HPF
GAS PNL BLDV: 140 MMOL/L — SIGNIFICANT CHANGE UP (ref 136–145)
GAS PNL BLDV: SIGNIFICANT CHANGE UP
GAS PNL BLDV: SIGNIFICANT CHANGE UP
GLUCOSE BLDV-MCNC: 96 MG/DL — SIGNIFICANT CHANGE UP (ref 70–99)
GLUCOSE SERPL-MCNC: 93 MG/DL — SIGNIFICANT CHANGE UP (ref 70–99)
GLUCOSE UR QL: NEGATIVE — SIGNIFICANT CHANGE UP
HCO3 BLDV-SCNC: 29 MMOL/L — SIGNIFICANT CHANGE UP (ref 21–29)
HCT VFR BLD CALC: 36.8 % — SIGNIFICANT CHANGE UP (ref 34.5–45)
HCT VFR BLDA CALC: 37 % — LOW (ref 39–50)
HGB BLD CALC-MCNC: 12 G/DL — SIGNIFICANT CHANGE UP (ref 11.5–15.5)
HGB BLD-MCNC: 12 G/DL — SIGNIFICANT CHANGE UP (ref 11.5–15.5)
KETONES UR-MCNC: NEGATIVE — SIGNIFICANT CHANGE UP
LACTATE BLDV-MCNC: 1 MMOL/L — SIGNIFICANT CHANGE UP (ref 0.7–2)
LEUKOCYTE ESTERASE UR-ACNC: NEGATIVE — SIGNIFICANT CHANGE UP
LYMPHOCYTES # BLD AUTO: 3.3 K/UL — SIGNIFICANT CHANGE UP (ref 1–3.3)
LYMPHOCYTES # BLD AUTO: 34.9 % — SIGNIFICANT CHANGE UP (ref 13–44)
MCHC RBC-ENTMCNC: 25.2 PG — LOW (ref 27–34)
MCHC RBC-ENTMCNC: 32.7 GM/DL — SIGNIFICANT CHANGE UP (ref 32–36)
MCV RBC AUTO: 77 FL — LOW (ref 80–100)
MONOCYTES # BLD AUTO: 0.6 K/UL — SIGNIFICANT CHANGE UP (ref 0–0.9)
MONOCYTES NFR BLD AUTO: 6.5 % — SIGNIFICANT CHANGE UP (ref 2–14)
NEUTROPHILS # BLD AUTO: 5.3 K/UL — SIGNIFICANT CHANGE UP (ref 1.8–7.4)
NEUTROPHILS NFR BLD AUTO: 56 % — SIGNIFICANT CHANGE UP (ref 43–77)
NITRITE UR-MCNC: NEGATIVE — SIGNIFICANT CHANGE UP
OTHER CELLS CSF MANUAL: 6 ML/DL — LOW (ref 18–22)
PCO2 BLDV: 57 MMHG — HIGH (ref 35–50)
PH BLDV: 7.32 — LOW (ref 7.35–7.45)
PH UR: 7 — SIGNIFICANT CHANGE UP (ref 5–8)
PLATELET # BLD AUTO: 348 K/UL — SIGNIFICANT CHANGE UP (ref 150–400)
PO2 BLDV: 28 MMHG — SIGNIFICANT CHANGE UP (ref 25–45)
POTASSIUM BLDV-SCNC: 3.7 MMOL/L — SIGNIFICANT CHANGE UP (ref 3.5–5)
POTASSIUM SERPL-MCNC: 3.7 MMOL/L — SIGNIFICANT CHANGE UP (ref 3.5–5.3)
POTASSIUM SERPL-SCNC: 3.7 MMOL/L — SIGNIFICANT CHANGE UP (ref 3.5–5.3)
PROT SERPL-MCNC: 8.1 G/DL — SIGNIFICANT CHANGE UP (ref 6–8.3)
PROT UR-MCNC: SIGNIFICANT CHANGE UP
RBC # BLD: 4.77 M/UL — SIGNIFICANT CHANGE UP (ref 3.8–5.2)
RBC # FLD: 15.6 % — HIGH (ref 10.3–14.5)
RBC CASTS # UR COMP ASSIST: SIGNIFICANT CHANGE UP /HPF (ref 0–2)
SAO2 % BLDV: 37 % — LOW (ref 67–88)
SODIUM SERPL-SCNC: 137 MMOL/L — SIGNIFICANT CHANGE UP (ref 135–145)
SP GR SPEC: 1.01 — SIGNIFICANT CHANGE UP (ref 1.01–1.02)
UROBILINOGEN FLD QL: NEGATIVE — SIGNIFICANT CHANGE UP
WBC # BLD: 9.4 K/UL — SIGNIFICANT CHANGE UP (ref 3.8–10.5)
WBC # FLD AUTO: 9.4 K/UL — SIGNIFICANT CHANGE UP (ref 3.8–10.5)
WBC UR QL: SIGNIFICANT CHANGE UP /HPF (ref 0–5)

## 2018-04-26 PROCEDURE — 74177 CT ABD & PELVIS W/CONTRAST: CPT | Mod: 26

## 2018-04-26 PROCEDURE — 99284 EMERGENCY DEPT VISIT MOD MDM: CPT

## 2018-04-26 RX ORDER — SODIUM CHLORIDE 9 MG/ML
1000 INJECTION INTRAMUSCULAR; INTRAVENOUS; SUBCUTANEOUS ONCE
Refills: 0 | Status: COMPLETED | OUTPATIENT
Start: 2018-04-26 | End: 2018-04-26

## 2018-04-26 RX ORDER — ONDANSETRON 8 MG/1
4 TABLET, FILM COATED ORAL ONCE
Refills: 0 | Status: COMPLETED | OUTPATIENT
Start: 2018-04-26 | End: 2018-04-26

## 2018-04-26 RX ADMIN — ONDANSETRON 4 MILLIGRAM(S): 8 TABLET, FILM COATED ORAL at 14:15

## 2018-04-26 RX ADMIN — SODIUM CHLORIDE 1000 MILLILITER(S): 9 INJECTION INTRAMUSCULAR; INTRAVENOUS; SUBCUTANEOUS at 14:15

## 2018-04-26 NOTE — ED ADULT NURSE NOTE - OBJECTIVE STATEMENT
Pt reports that on Sunday she ate steamed broccoli chinese food, her "eyes were tender," "whole face swelled up," had abdominal pain, and vomited x 4.  Since then her "eyes still burn" and has abdominal pain, no vomiting since then.  Yesterday she felt chills but did not check her temperature, today she feels "ill."  Took 2 Tylenol today.  Able to tolerate PO but reports decreased intake due to pain, had coffee and sandwich today, denies diarrhea, urinary symptoms, vomiting, cp, sob.  Reports vaginal itching x 4-5 days, no discharge, LMP ended 2 days ago, last saw GYN 1.5 years ago for "UTI."  Pt had an "an appendix and hernia surgery" 1 - 1.5 years ago.      cesilia [id number] used for this interview. Pt reports that on Sunday she ate steamed broccoli chinese food, her "eyes were tender," "whole face swelled up," had abdominal pain, and vomited x 4.  Since then her "eyes still burn" and has abdominal pain, no vomiting since then.  Yesterday she felt chills but did not check her temperature, today she feels "ill."  Took 2 Tylenol today.  Able to tolerate PO but reports decreased intake due to pain, had coffee and sandwich today, abd soft/nd/ttp across lower abdomen and umbilical region, denies diarrhea, urinary symptoms, vomiting, cp, sob.  Reports vaginal itching x 4-5 days, no discharge, LMP ended 2 days ago, last saw GYN 1.5 years ago for "UTI."  Pt had an "an appendix and hernia surgery" 1 - 1.5 years ago.      cesilia [id number] used for this interview. Pt reports that on Sunday she ate steamed broccoli chinese food, her "eyes were tender," "whole face swelled up," had abdominal pain, and vomited x 4.  Since then her "eyes still burn" and has abdominal pain, no vomiting since then.  Yesterday she felt chills but did not check her temperature, today she feels "ill."  Took 2 Tylenol last night.  Able to tolerate PO but reports decreased intake due to pain, had coffee and sandwich today, abd soft/nd/ttp across lower abdomen and umbilical region, denies diarrhea, urinary symptoms, vomiting, cp, sob.  Reports vaginal itching x 4-5 days, no discharge, LMP ended 2 days ago, last saw GYN 1.5 years ago for "UTI."  Pt had an "an appendix and hernia surgery" 1 - 1.5 years ago.      cesilia [id number] used for this interview.

## 2018-04-26 NOTE — ED PROVIDER NOTE - PHYSICAL EXAMINATION
Vitals: WNL  Gen: laying comfortably in NAD  Head: NCAT  ENT: sclerae white, anicterus, moist mucous membranes. No exudates. Neck supple, no LAD,  no carotid bruits, no JVD  CV: RRR. Audible S1 and S2. No murmurs, rubs, gallops, S3, nor S4, 2+ radial and DP pulses   Pulm: Clear to auscultation bilaterally. No wheezes, rales, or rhonchi  Abd: soft, normoactive BS x4, lower abd mildly ttp, no rebound, no guarding, no rashes  Musculoskeletal:  No peripheral edema  Skin: no lesions or scars noted  Neurologic: AAOx3  : no CVA tenderness; pelvic (chaperoned by Francesco Sarah): no CMT, adnexal tenderness, no vaginal discharge, no lesions on cervix, os closed

## 2018-04-26 NOTE — ED PROVIDER NOTE - NS ED ROS FT
Constitutional: no fevers, chills  HEENT: no visual changes, no sore throat, no rhinorrhea  CV: no cp  Resp: no sob  GI: + abd pain, n/v, diarrhea/constipation  : no dysuria, hematuria  MSK: no joint pains  skin: no rashes  neuro: no HA, no confusion  psych: no SI/HI

## 2018-04-26 NOTE — ED PROVIDER NOTE - ATTENDING CONTRIBUTION TO CARE
Attending Note (Nicky): patient complaining of abdomen pain after eating days ago. tolerating po since. no emesis. rlq and llq tenderness.  ct abd/pelvis and reassess.

## 2018-04-26 NOTE — ED PROVIDER NOTE - PLAN OF CARE
1) Please follow-up with your primary care doctor within the next 3 days.  Please call today or tomorrow for an appointment.  If you cannot follow-up with your doctor(s), please return to the ED for any urgent issues.  2) If you have any worsening of symptoms or any other concerns please return to the ED immediately.  3) Please continue taking your home medications as directed.  4) You may have been given a copy of your labs and/or imaging.  Please go over these with your primary care doctor. 1) You labs were all normalPlease follow-up with your primary care doctor within the next 3 days.  Please call today or tomorrow for an appointment.  If you cannot follow-up with your doctor(s), please return to the ED for any urgent issues.  2) If you have any worsening of symptoms or any other concerns please return to the ED immediately.  3) Please continue taking your home medications as directed.  4) You may have been given a copy of your labs and/or imaging.  Please go over these with your primary care doctor.

## 2018-04-26 NOTE — ED PROVIDER NOTE - OBJECTIVE STATEMENT
43yo F no pmh p/w vomiting x1 and abd pain that started 5d ago after eating Chinese food. Pt ate Chinese food and immediately after, developed swelling around her b/l eyes, vomited 4x that day and had abd pain. Since then, swelling aroudn her eyes has resolved but still with lower abd pain. Still having normal nonbloody BM, passing flatus. Decreased appetite but no nausea/vomiting. Subjective fever yesterday which she took tylenol for. +vaginal itching. Denies diff breathing, sob, vaginal discharge, urinary symptoms.

## 2018-04-26 NOTE — ED PROVIDER NOTE - CARE PLAN
Principal Discharge DX:	Fall  Assessment and plan of treatment:	1) Please follow-up with your primary care doctor within the next 3 days.  Please call today or tomorrow for an appointment.  If you cannot follow-up with your doctor(s), please return to the ED for any urgent issues.  2) If you have any worsening of symptoms or any other concerns please return to the ED immediately.  3) Please continue taking your home medications as directed.  4) You may have been given a copy of your labs and/or imaging.  Please go over these with your primary care doctor.  Secondary Diagnosis:	Dizziness Principal Discharge DX:	Abdominal pain  Assessment and plan of treatment:	1) Please follow-up with your primary care doctor within the next 3 days.  Please call today or tomorrow for an appointment.  If you cannot follow-up with your doctor(s), please return to the ED for any urgent issues.  2) If you have any worsening of symptoms or any other concerns please return to the ED immediately.  3) Please continue taking your home medications as directed.  4) You may have been given a copy of your labs and/or imaging.  Please go over these with your primary care doctor.  Secondary Diagnosis:	Dizziness Principal Discharge DX:	Abdominal pain  Assessment and plan of treatment:	1) You labs were all normalPlease follow-up with your primary care doctor within the next 3 days.  Please call today or tomorrow for an appointment.  If you cannot follow-up with your doctor(s), please return to the ED for any urgent issues.  2) If you have any worsening of symptoms or any other concerns please return to the ED immediately.  3) Please continue taking your home medications as directed.  4) You may have been given a copy of your labs and/or imaging.  Please go over these with your primary care doctor.  Secondary Diagnosis:	Dizziness

## 2018-04-26 NOTE — ED PROVIDER NOTE - MEDICAL DECISION MAKING DETAILS
41yo F no pmh p/w vomiting x1 and abd pain that started 5d ago after eating Chinese food. Colitis v gastroenteritis v UTI. Unlikely gyn in nature given unremarkable pelvic exam. Labs, ua, zofran, ivfs. Likely d/c home with symptomatic treatment.

## 2018-07-26 PROBLEM — Z80.0 FAMILY HISTORY OF COLON CANCER: Status: INACTIVE | Noted: 2017-05-02

## 2019-07-18 NOTE — ED ADULT NURSE NOTE - PSH
Subjective     Donna Slipper 59 y.o. female presents 7/18/19 with   Chief Complaint   Patient presents with    Cough     C/o cough 1x week. Patient tried cough drop with no relief     Headache     C/O headache 1x day. Patient tried tylenol with some relief. Patient reports it feels like pressure on her forehead        URI    This is a new problem. The current episode started in the past 7 days. The problem has been gradually worsening. There has been no fever. Associated symptoms include congestion, coughing and a sore throat. Pertinent negatives include no chest pain, diarrhea, dysuria, ear pain, nausea, neck pain, plugged ear sensation, rash, sinus pain, sneezing, swollen glands or vomiting. Treatments tried: sinus and cough OTC medicine. The treatment provided no relief. Has some urinary incontinence and feels irritated on her vulva. She has tried OTC monistat cream with no relief.      Reviewed thefollowing history:    Past Medical History:   Diagnosis Date    Ankle mass 7/28/2016    Anticoagulation goal of INR 2 to 3 2/11/2014    Anxiety     Atrial arrhythmia     Atrial fibrillation (HCC)     Bilateral carotid artery stenosis 7/18/2017    50-69% LT, <50% RT on 06/2017 U/S - followed by cardiology    COPD (chronic obstructive pulmonary disease) ([facility] [address]     Fibroids     Hyperlipidemia     Hypertension     Moderate tricuspid regurgitation 8/28/2018    Obesity     Class I    PFO (patent foramen ovale) 6/22/2014    Treated with occlusive device      Seasonal allergies 67/75/0037    Systolic ejection murmur     Type 2 diabetes mellitus with microalbuminuria, without long-term current use of insulin ([facility] [address] 11/29/2016    Varicose veins 2/11/2014    Varicose veins of both lower extremities 11/28/2017    Vitamin D deficiency 11/29/2016     Past Surgical History:   Procedure Laterality Date    ASD REPAIR  12/02/2008    DR Tom Walsh    COLONOSCOPY  2015    DR Belinda Jorge    ENDOMETRIAL ABLATION  2004   
No significant past surgical history

## 2020-08-04 ENCOUNTER — EMERGENCY (EMERGENCY)
Facility: HOSPITAL | Age: 40
LOS: 1 days | Discharge: HOME | End: 2020-08-04
Attending: EMERGENCY MEDICINE
Payer: MEDICAID

## 2020-08-04 VITALS
DIASTOLIC BLOOD PRESSURE: 84 MMHG | OXYGEN SATURATION: 97 % | WEIGHT: 130.07 LBS | HEART RATE: 81 BPM | TEMPERATURE: 99 F | RESPIRATION RATE: 16 BRPM | HEIGHT: 60 IN | SYSTOLIC BLOOD PRESSURE: 147 MMHG

## 2020-08-04 DIAGNOSIS — Z98.891 HISTORY OF UTERINE SCAR FROM PREVIOUS SURGERY: Chronic | ICD-10-CM

## 2020-08-04 PROCEDURE — 72125 CT NECK SPINE W/O DYE: CPT | Mod: 26

## 2020-08-04 PROCEDURE — 73090 X-RAY EXAM OF FOREARM: CPT | Mod: 26,LT

## 2020-08-04 PROCEDURE — 93010 ELECTROCARDIOGRAM REPORT: CPT

## 2020-08-04 PROCEDURE — 99291 CRITICAL CARE FIRST HOUR: CPT

## 2020-08-04 PROCEDURE — 73060 X-RAY EXAM OF HUMERUS: CPT | Mod: 26,LT

## 2020-08-04 PROCEDURE — 99202 OFFICE O/P NEW SF 15 MIN: CPT

## 2020-08-04 PROCEDURE — 73130 X-RAY EXAM OF HAND: CPT | Mod: 26,LT

## 2020-08-04 PROCEDURE — 72170 X-RAY EXAM OF PELVIS: CPT | Mod: 26

## 2020-08-04 PROCEDURE — 71046 X-RAY EXAM CHEST 2 VIEWS: CPT | Mod: 26

## 2020-08-04 PROCEDURE — 70450 CT HEAD/BRAIN W/O DYE: CPT | Mod: 26

## 2020-08-04 RX ORDER — LIDOCAINE 4 G/100G
1 CREAM TOPICAL ONCE
Refills: 0 | Status: COMPLETED | OUTPATIENT
Start: 2020-08-04 | End: 2020-08-04

## 2020-08-04 RX ADMIN — LIDOCAINE 1 PATCH: 4 CREAM TOPICAL at 23:53

## 2020-08-04 NOTE — ED PROVIDER NOTE - CARE PLAN
Principal Discharge DX:	Subarachnoid hemorrhage  Secondary Diagnosis:	Closed fracture of multiple ribs, unspecified laterality, initial encounter

## 2020-08-04 NOTE — ED PROVIDER NOTE - CRITICAL CARE PROVIDED
additional history taking/interpretation of diagnostic studies/documentation/consultation with other physicians

## 2020-08-04 NOTE — ED PROVIDER NOTE - CARE PROVIDER_API CALL
Alex Davenport  NEUROSURGERY  [address] [place] [address]  Phone: [phone]  Fax: [phone]  Scheduled Appointment: 08/19/2020

## 2020-08-04 NOTE — ED PROVIDER NOTE - ATTENDING CONTRIBUTION TO CARE
Attending MD Blanco:   I personally have seen and examined this patient.  Physician assistant note reviewed and agree on plan of care and except where noted.  See below for details.     Seen in [place] [facility]    40F with PMH/PSH including s/p appendectomy, s/p cholecystectomy presents to the ED with pain s/p fall down steps on Sunday.  Reports was cleaning a home on Sunday when her feet got tangled on the vacuum cord and she fell backwards down about 7 steps.  Reports that she did not want to come to the ED because she does not have insurance but reports over the following two days her pain has worsened.  Reports that initially after fall hit head and had LOC.  Denies seizure.  Denies loss of urinary or bowel continence. Denies change in vision, double vision, sudden loss of vision, loss of hearing, tinnitus.  Reports generalized pain, but also neck pain, back pain, L sided chest pain, L buttock pain, LUE pain.  Denies shortness of breath, cough, known COVID or COVID contacts.  Denies abdominal pain, diarrhea, blood in stools. Report has had nausea since fall but reports much improved from the first day.  Reports that after the fall was so nauseated that she had to sleep sitting up for fear of emesis with laying down.  Denies numbness, weakness or tingling in extremities. Denies dysuria, hematuria, change in urinary habits including frequency, urgency. A ten (10) point review of systems was negative other than as stated in the HPI or elsewhere in the chart.     Exam:   General: NAD  HENT: head NCAT, airway patent with moist mucous membranes  Eyes: PERRL  Lungs: lungs CTAB with good inspiratory effort, no wheezing, no rhonchi, no rales, +tenderness to palpation to the L lower anterior chest and axillary line, no crepitus, no ecchymosis  Cardiac: +S1S2, no m/r/g  GI: abdomen soft with +BS, NT, ND  : no CVAT  MSK: FROM at neck, +tenderness to cervical spine midline palpation, +ecchymosis at midline cervical spine, no stepoffs along length of spine, no calf tenderness, swelling, erythema or warmth, FROM at bilateral shoulders, elbows, wrists and hands, +tenderness to palpation at distal upper arm, elbow and dorsal L hand, cap refill <2s, +2 radials and 2 DPs,   Neuro: moving all extremities with 5/5 strength bilateral upper and lower extremities, good and equal  strength bilaterally, sensory grossly intact, no gross neuro deficits  Psych: normal mood and affect  Skin: ecchymosis to the L elbow, L dorsal hand, L buttock, with overlying tenderness in these areas    A/P: 40F s/p fall down steps, suspect concussion, given midline C spine tenderness will obtain     TO BE COMPLETED Attending MD Blanco:   I personally have seen and examined this patient.  Physician assistant note reviewed and agree on plan of care and except where noted.  See below for details.     Seen in [place] [facility]    40F with PMH/PSH including s/p appendectomy, s/p cholecystectomy presents to the ED with pain s/p fall down steps on Sunday.  Reports was cleaning a home on Sunday when her feet got tangled on the vacuum cord and she fell backwards down about 7 steps.  Reports that she did not want to come to the ED because she does not have insurance but reports over the following two days her pain has worsened.  Reports that initially after fall hit head and had LOC.  Denies seizure.  Denies loss of urinary or bowel continence. Denies change in vision, double vision, sudden loss of vision, loss of hearing, tinnitus.  Reports generalized pain, but also neck pain, back pain, L sided chest pain, L buttock pain, LUE pain.  Denies shortness of breath, cough, known COVID or COVID contacts.  Denies abdominal pain, diarrhea, blood in stools. Report has had nausea since fall but reports much improved from the first day.  Reports that after the fall was so nauseated that she had to sleep sitting up for fear of emesis with laying down.  Denies numbness, weakness or tingling in extremities. Denies dysuria, hematuria, change in urinary habits including frequency, urgency. A ten (10) point review of systems was negative other than as stated in the HPI or elsewhere in the chart.     Exam:   General: NAD  HENT: head NCAT, airway patent with moist mucous membranes  Eyes: PERRL  Lungs: lungs CTAB with good inspiratory effort, no wheezing, no rhonchi, no rales, +tenderness to palpation to the L lower anterior chest and axillary line, no crepitus, no ecchymosis  Cardiac: +S1S2, no m/r/g  GI: abdomen soft with +BS, NT, ND  : no CVAT  MSK: FROM at neck, +tenderness to cervical to thoracic spine midline palpation, +paraspinal tenderness in LS, +ecchymosis at midline cervical spine, no stepoffs along length of spine, no calf tenderness, swelling, erythema or warmth, FROM at bilateral shoulders, elbows, wrists and hands, +tenderness to palpation at distal upper arm, elbow and dorsal L hand, cap refill <2s, +2 radials and 2 DPs,   Neuro: moving all extremities with 5/5 strength bilateral upper and lower extremities, good and equal  strength bilaterally, sensory grossly intact, no gross neuro deficits  Psych: normal mood and affect  Skin: ecchymosis to the L elbow, L dorsal hand, L buttock, with overlying tenderness in these areas    A/P: 40F s/p fall down steps, suspect concussion, given midline C spine tenderness will obtain     TO BE COMPLETED Attending MD Blanco:   I personally have seen and examined this patient.  Physician assistant note reviewed and agree on plan of care and except where noted.  See below for details.     Seen in [facility], interviewed in Congolese    40F with PMH/PSH including s/p appendectomy, s/p cholecystectomy presents to the ED with pain s/p fall down steps on Sunday.  Reports was cleaning a home on Sunday when her feet got tangled on the vacuum cord and she fell backwards down about 7 steps.  Reports that she did not want to come to the ED because she does not have insurance but reports over the following two days her pain has worsened.  Reports that initially after fall hit head and had LOC, persistent pain.  Denies seizure.  Denies loss of urinary or bowel continence. Denies change in vision, double vision, sudden loss of vision, loss of hearing, tinnitus.  Reports generalized pain, but also neck pain, back pain, L sided chest pain, L buttock pain, LUE pain.  Denies shortness of breath, cough, known COVID or COVID contacts.  Denies abdominal pain, diarrhea, blood in stools. Report has had nausea since fall but reports much improved from the first day.  Reports that after the fall was so nauseated that she had to sleep sitting up for fear of emesis with laying down.  Denies numbness, weakness or tingling in extremities. Denies dysuria, hematuria, change in urinary habits including frequency, urgency. A ten (10) point review of systems was negative other than as stated in the HPI or elsewhere in the chart.     Exam:   General: NAD  HENT: head NCAT, airway patent with moist mucous membranes  Eyes: PERRL  Lungs: lungs CTAB with good inspiratory effort, no wheezing, no rhonchi, no rales, +tenderness to palpation to the L lower anterior chest and axillary line, no crepitus, no ecchymosis  Cardiac: +S1S2, no m/r/g  GI: abdomen soft with +BS, NT, ND  : no CVAT  MSK: FROM at neck, +tenderness to cervical to thoracic spine midline palpation, +paraspinal tenderness in LS, +ecchymosis at midline cervical spine, no stepoffs along length of spine, no calf tenderness, swelling, erythema or warmth, FROM at bilateral shoulders, elbows, wrists and hands, +tenderness to palpation at distal upper arm, elbow and dorsal L hand, cap refill <2s, +2 radials and 2 DPs,   Neuro: moving all extremities with 5/5 strength bilateral upper and lower extremities, good and equal  strength bilaterally, sensory grossly intact, no gross neuro deficits  Psych: normal mood and affect  Skin: ecchymosis to the L elbow, L dorsal hand, L buttock, with overlying tenderness in these areas    A/P: 40F s/p fall down steps, suspect concussion, given midline C spine tenderness will obtain to rule out bony injury, CT head to rule ICH, given tenderness at LUE will obtain L forearm, L hand, L humerus, XR pelvis, CXR, will give pain control, EKG, reassess

## 2020-08-04 NOTE — ED ADULT NURSE NOTE - OBJECTIVE STATEMENT
39 y/o female presenting to ED for fall x 4 days ago. Per pt, she was cleaning a home and she [facility] and [facility] down [facility] steps. Pt endorses hitting her head and LOC. Pt endorses nausea, no vomiting. Pt reports generalized pain to right neck, right shoulder, back, left buttock & left LLE. Upon exam pt A&Ox3 gross neuro intact, lungs cta bilaterally, no difficulty speaking in complete sentences, s1s2 heart sounds heard, pulses x 4, nelson x4, abdomen soft nontender nondistended, skin intact, eccymosis noted to right shoulder. Pt denies chest pain, sob, ha, n/v/d, abdominal pain, f/c, urinary symptoms, hematuria.

## 2020-08-04 NOTE — ED PROVIDER NOTE - PROGRESS NOTE DETAILS
Dr. Samuel's note: At the beginning of my shift, I took over care of the patient from outgoing physician with plan to follow up 6 hr head CT results, CT ches/abd pelvis, neurosurg final recs, trauma surgery final recs. repeat CT head stable from previous. Neurosurgery signed off on patient and would like her to go home with keppra x1 week and f/u with Issac in 2 weeks. CT chest/abd significant for 2 left sided rib fx. pt with mild tenderness here but no SOB. pain improved while in the ED. trauma surgery cleared pt to go home. pt safe for d/c with incentive spirometer, pain meds, nausea meds, keppra, neurosurg f/u, return precautions.

## 2020-08-04 NOTE — ED PROVIDER NOTE - NSFOLLOWUPINSTRUCTIONS_ED_ALL_ED_FT
you have 2 rib fractures as well as a subarachnoid hemorrhage (blood in the brain)  Tiene 2 fracturas de rubina costillas y un poco de alec en virgen cerebro    You may have headaches,  nausea, dizziness, rib pain over the next few days  Puede que sufra de dolor de gisele, nausea, mareo, dolor de virgen jenny por unos hernandez    Take the pain medicine as needed   [place] el medicamento para el dolor cada que sea necesario    Take the nausea medicine as needed  [place] el medicamento para la nausea cada que sea necesario    Use the incentive spirometer every few hours for 5 days  Use el aparato para ayudarle a respirar cada brandy horas por briana hernandez    Take keppra twice a day for 1 week to prevent seizures  [place] keppra dos veces al alfonso por adriel semana para prevenir convulciones    follow up with neurosurgery in 2 weeks.  Tiene nagi con neurocirugia en 2 semanas para que la vuelvan a evaluar.

## 2020-08-04 NOTE — ED PROVIDER NOTE - PATIENT PORTAL LINK FT
You can access the FollowMyHealth Patient Portal offered by [facility] by registering at the following website: [email or web address]. By joining [employer]’s FollowMyHealth portal, you will also be able to view your health information using other applications (apps) compatible with our system.

## 2020-08-04 NOTE — ED ADULT TRIAGE NOTE - CHIEF COMPLAINT QUOTE
c/o pain of back and shoulder s/p fall on 8/2; initially tripped over a wire and fell down 7 stairs, report hitting head but no LOC/ anticoagulant use.

## 2020-08-05 VITALS
DIASTOLIC BLOOD PRESSURE: 67 MMHG | HEART RATE: 63 BPM | TEMPERATURE: 98 F | OXYGEN SATURATION: 100 % | RESPIRATION RATE: 16 BRPM | SYSTOLIC BLOOD PRESSURE: 103 MMHG

## 2020-08-05 LAB
ALBUMIN SERPL ELPH-MCNC: 4 G/DL — SIGNIFICANT CHANGE UP (ref 3.3–5)
ALP SERPL-CCNC: 101 U/L — SIGNIFICANT CHANGE UP (ref 40–120)
ALT FLD-CCNC: 32 U/L — SIGNIFICANT CHANGE UP (ref 10–45)
ANION GAP SERPL CALC-SCNC: 12 MMOL/L — SIGNIFICANT CHANGE UP (ref 5–17)
APTT BLD: 32.4 SEC — SIGNIFICANT CHANGE UP (ref 27.5–35.5)
AST SERPL-CCNC: 32 U/L — SIGNIFICANT CHANGE UP (ref 10–40)
BILIRUB SERPL-MCNC: 0.1 MG/DL — LOW (ref 0.2–1.2)
BLD GP AB SCN SERPL QL: NEGATIVE — SIGNIFICANT CHANGE UP
BUN SERPL-MCNC: 10 MG/DL — SIGNIFICANT CHANGE UP (ref 7–23)
CALCIUM SERPL-MCNC: 9.1 MG/DL — SIGNIFICANT CHANGE UP (ref 8.4–10.5)
CHLORIDE SERPL-SCNC: 104 MMOL/L — SIGNIFICANT CHANGE UP (ref 96–108)
CO2 SERPL-SCNC: 26 MMOL/L — SIGNIFICANT CHANGE UP (ref 22–31)
CREAT SERPL-MCNC: 0.65 MG/DL — SIGNIFICANT CHANGE UP (ref 0.5–1.3)
GAS PNL BLDV: SIGNIFICANT CHANGE UP
GLUCOSE SERPL-MCNC: 108 MG/DL — HIGH (ref 70–99)
HCT VFR BLD CALC: 35.7 % — SIGNIFICANT CHANGE UP (ref 34.5–45)
HGB BLD-MCNC: 11 G/DL — LOW (ref 11.5–15.5)
INR BLD: 0.98 RATIO — SIGNIFICANT CHANGE UP (ref 0.88–1.16)
MCHC RBC-ENTMCNC: 24.7 PG — LOW (ref 27–34)
MCHC RBC-ENTMCNC: 30.8 GM/DL — LOW (ref 32–36)
MCV RBC AUTO: 80.2 FL — SIGNIFICANT CHANGE UP (ref 80–100)
NRBC # BLD: 0 /100 WBCS — SIGNIFICANT CHANGE UP (ref 0–0)
PLATELET # BLD AUTO: 321 K/UL — SIGNIFICANT CHANGE UP (ref 150–400)
POTASSIUM SERPL-MCNC: 3.7 MMOL/L — SIGNIFICANT CHANGE UP (ref 3.5–5.3)
POTASSIUM SERPL-SCNC: 3.7 MMOL/L — SIGNIFICANT CHANGE UP (ref 3.5–5.3)
PROT SERPL-MCNC: 7.2 G/DL — SIGNIFICANT CHANGE UP (ref 6–8.3)
PROTHROM AB SERPL-ACNC: 11.7 SEC — SIGNIFICANT CHANGE UP (ref 10.6–13.6)
RBC # BLD: 4.45 M/UL — SIGNIFICANT CHANGE UP (ref 3.8–5.2)
RBC # FLD: 16 % — HIGH (ref 10.3–14.5)
RH IG SCN BLD-IMP: POSITIVE — SIGNIFICANT CHANGE UP
SARS-COV-2 RNA SPEC QL NAA+PROBE: SIGNIFICANT CHANGE UP
SODIUM SERPL-SCNC: 142 MMOL/L — SIGNIFICANT CHANGE UP (ref 135–145)
WBC # BLD: 7.46 K/UL — SIGNIFICANT CHANGE UP (ref 3.8–10.5)
WBC # FLD AUTO: 7.46 K/UL — SIGNIFICANT CHANGE UP (ref 3.8–10.5)

## 2020-08-05 PROCEDURE — 74176 CT ABD & PELVIS W/O CONTRAST: CPT | Mod: 26

## 2020-08-05 PROCEDURE — 71250 CT THORAX DX C-: CPT | Mod: 26

## 2020-08-05 PROCEDURE — 72128 CT CHEST SPINE W/O DYE: CPT | Mod: 26

## 2020-08-05 PROCEDURE — 70450 CT HEAD/BRAIN W/O DYE: CPT | Mod: 26

## 2020-08-05 PROCEDURE — 72131 CT LUMBAR SPINE W/O DYE: CPT | Mod: 26

## 2020-08-05 RX ORDER — ONDANSETRON 8 MG/1
1 TABLET, FILM COATED ORAL
Qty: 15 | Refills: 0
Start: 2020-08-05

## 2020-08-05 RX ORDER — LEVETIRACETAM 250 MG/1
1 TABLET, FILM COATED ORAL
Qty: 14 | Refills: 0
Start: 2020-08-05 | End: 2020-08-11

## 2020-08-05 RX ORDER — MORPHINE SULFATE 50 MG/1
2 CAPSULE, EXTENDED RELEASE ORAL ONCE
Refills: 0 | Status: DISCONTINUED | OUTPATIENT
Start: 2020-08-05 | End: 2020-08-05

## 2020-08-05 RX ORDER — MORPHINE SULFATE 50 MG/1
4 CAPSULE, EXTENDED RELEASE ORAL ONCE
Refills: 0 | Status: DISCONTINUED | OUTPATIENT
Start: 2020-08-05 | End: 2020-08-05

## 2020-08-05 RX ORDER — SODIUM CHLORIDE 9 MG/ML
1000 INJECTION INTRAMUSCULAR; INTRAVENOUS; SUBCUTANEOUS ONCE
Refills: 0 | Status: COMPLETED | OUTPATIENT
Start: 2020-08-05 | End: 2020-08-05

## 2020-08-05 RX ADMIN — MORPHINE SULFATE 4 MILLIGRAM(S): 50 CAPSULE, EXTENDED RELEASE ORAL at 04:50

## 2020-08-05 RX ADMIN — MORPHINE SULFATE 2 MILLIGRAM(S): 50 CAPSULE, EXTENDED RELEASE ORAL at 01:05

## 2020-08-05 RX ADMIN — SODIUM CHLORIDE 1000 MILLILITER(S): 9 INJECTION INTRAMUSCULAR; INTRAVENOUS; SUBCUTANEOUS at 01:47

## 2020-08-05 RX ADMIN — MORPHINE SULFATE 4 MILLIGRAM(S): 50 CAPSULE, EXTENDED RELEASE ORAL at 04:22

## 2020-08-05 NOTE — CONSULT NOTE ADULT - SUBJECTIVE AND OBJECTIVE BOX
p ([id number])     HPI:  40F with PMH/PSH including s/p appendectomy, s/p cholecystectomy presents to the ED with pain s/p fall down steps on .  Reports was cleaning a home on  when her feet got tangled on the vacuum cord and she fell backwards down about 7 steps.  Reports that she did not want to come to the ED because she does not have insurance but reports over the following two days her pain has worsened.  Reports that initially after fall hit head and had LOC.  Denies seizure.  Denies loss of urinary or bowel continence. Denies change in vision, double vision, sudden loss of vision, loss of hearing, tinnitus.  Reports generalized pain, but also neck pain, back pain, L sided chest pain, L buttock pain, LUE pain.  Denies shortness of breath, cough, known COVID or COVID contacts.  Denies abdominal pain, diarrhea, blood in stools. Report has had nausea since fall but reports much improved from the first day.  Reports that after the fall was so nauseated that she had to sleep sitting up for fear of emesis with laying down.  Denies numbness, weakness or tingling in extremities. Denies dysuria, hematuria, change in urinary habits including frequency, urgency. A ten (10) point review of systems was negative other than as stated in the HPI or elsewhere in the chart.     Imaging:  small frontal tSAH  Exam:  AOx3, FC, PERRL, EOMI, no facial, 5/5 throughout, no drift    --Anticoagulation:    =====================  PAST MEDICAL HISTORY   No pertinent past medical history    PAST SURGICAL HISTORY   H/O:   No significant past surgical history        MEDICATIONS:  Antibiotics:    Neuro:    Other:      SOCIAL HISTORY:   Occupation:   Marital Status:     FAMILY HISTORY:  No pertinent family history in first degree relatives      ROS: Negative except per HPI    LABS:  PT/INR - ( 05 Aug 2020 00:48 )   PT: 11.7 sec;   INR: 0.98 ratio         PTT - ( 05 Aug 2020 00:48 )  PTT:32.4 sec                        11.0   7.46  )-----------( 321      ( 05 Aug 2020 00:48 )             35.7     08-05    142  |  104  |  10  ----------------------------<  108<H>  3.7   |  26  |  0.65    Ca    9.1      05 Aug 2020 00:48    TPro  7.2  /  Alb  4.0  /  TBili  0.1<L>  /  DBili  x   /  AST  32  /  ALT  32  /  AlkPhos  101  08-05

## 2020-08-05 NOTE — CONSULT NOTE ADULT - SUBJECTIVE AND OBJECTIVE BOX
Patient is a 40 year old female with no past medical history and past surgical history of an appendectomy and hernia repair, presents to [facility] ED after falling down 7 flights of stairs on 20. Patient states fell down the stairs and hit the back of her head. Patient states she briefly lost consciousness Patient states that she has pain in her right shoulder, right hip, and the right occiput. Patient denies abdominal pain. Patient received CT head in the ED showing small subarachnoid hematoma. Patient states has been feeling dizzy, tired, and has felt nauseas with multiple episodes of emesis. Patient denies fever, chills and shortness of breath       PAST MEDICAL & SURGICAL HISTORY:  No pertinent past medical history  H/O:       MEDICATIONS  (STANDING):    MEDICATIONS  (PRN):      Allergies    No Known Allergies    Intolerances        SOCIAL HISTORY:    FAMILY HISTORY:  No pertinent family history in first degree relatives          Physical Exam:  General: NAD, resting comfortably  HEENT: NC/AT, EOMI, normal hearing, no oral lesions, no LAD, neck supple, pain to palpation at occiput and C1  Pulmonary: normal resp effort, CTA-B  Cardiovascular: NSR, no murmurs  Abdominal: soft, non distended, non tender to palpation   Extremities: FROM, tender to palpation in left shoulder, left hip, and left leg  Neuro: A/O x 3, CNs II-XII grossly intact, normal sensation, no focal deficits  Pulses: palpable distal pulses  Spine: Tender to palpation at C1, non tender to palpation along thoracic and lumbar spine, no step offs or obvious deformities     Vital Signs Last 24 Hrs  T(C): 37.3 (04 Aug 2020 20:55), Max: 37.3 (04 Aug 2020 20:55)  T(F): 99.1 (04 Aug 2020 20:55), Max: 99.1 (04 Aug 2020 20:55)  HR: 82 (04 Aug 2020 23:53) (81 - 82)  BP: 122/58 (04 Aug 2020 23:53) (122/58 - 147/84)  BP(mean): --  RR: 17 (04 Aug 2020 23:53) (16 - 17)  SpO2: 98% (04 Aug 2020 23:53) (97% - 98%)    I&O's Summary          LABS:                        11.0   7.46  )-----------( 321      ( 05 Aug 2020 00:48 )             35.7     08-    142  |  104  |  10  ----------------------------<  108<H>  3.7   |  26  |  0.65    Ca    9.1      05 Aug 2020 00:48    TPro  7.2  /  Alb  4.0  /  TBili  0.1<L>  /  DBili  x   /  AST  32  /  ALT  32  /  AlkPhos  101      PT/INR - ( 05 Aug 2020 00:48 )   PT: 11.7 sec;   INR: 0.98 ratio         PTT - ( 05 Aug 2020 00:48 )  PTT:32.4 sec    CAPILLARY BLOOD GLUCOSE        LIVER FUNCTIONS - ( 05 Aug 2020 00:48 )  Alb: 4.0 g/dL / Pro: 7.2 g/dL / ALK PHOS: 101 U/L / ALT: 32 U/L / AST: 32 U/L / GGT: x             Cultures:      RADIOLOGY & ADDITIONAL STUDIES:    < from: CT Head No Cont (20 @ 23:16) >  INTERPRETATION:  CLINICAL INFORMATION: Trauma. Status post fall with pain.    TECHNIQUE: Noncontrast axial CT imageswere acquired through the head. Two-dimensional sagittal and coronal reformats were generated. Axial images through the cervical spine were obtained using multislice helical technique.  Reformatted coronal and sagittal images were performed.    COMPARISON STUDY: None    FINDINGS:    Head:  There is subtle hyperdensity in the left parafalcine region on series 2 images 17 through 19 concerning for trace subarachnoid hemorrhage. There is no parenchymal or intraventricular hemorrhage. There is evidence of acute infarct or mass effect from vasogenic edema.  The ventricles and sulci are within normal limits. No abnormal extra-axial fluid collection or midline shift.  The visualized paranasal sinuses are clear. The mastoid air cells are clear.  The calvarium is intact. The soft tissues of the scalp are unremarkable.    Cervical spine:  No acute vertebral fracture or subluxation.  Multilevel degenerative changes characterized by intervertebral disc space narrowing, endplate sclerosis, and osteophyte formation.  There is mild multilevel osseous spinal canal stenosis due to disc osteophyte complex.  Uncovertebral and facet hypertrophy results in multilevel bilateral neural foraminal stenosis of varying degrees. Nonspecific straightening of the normal cervical lordosis.  The visualized soft tissues of the neck are within normal limits.  The visualized lung apices are clear.    IMPRESSION:    Head:  Trace left parafalcine subarachnoid hemorrhage. No calvarial fracture or mass effect from vasogenic edema.    Cervical spine:  No acute fracture or traumatic malalignment of the cervical spine.    < end of copied text >

## 2020-08-05 NOTE — ED ADULT NURSE REASSESSMENT NOTE - NS ED NURSE REASSESS COMMENT FT1
Report received from MORIAH Martel. Upon assessment, pt is AO x 4, speaking in full sentences. Pt pending CT.  Pt denies needs at this time.

## 2020-08-05 NOTE — CONSULT NOTE ADULT - ASSESSMENT
MARQUEZELISSA MASSEY  40y presents with headache after a fall down steps on sunday after tripping over wires. +LOC. Not on AC/AP. No shaking like episode, tongue biting. Has nausea, posterior headache and neck pain, and upper thoracic pain. No radiculopathy, weakness or numbness, no vision changes.   Imaging: small frontal interhemispheric tSAH  Exam: intact, has bruising in upper thoracic spine with pain on palpation  - No acute neurosurgical intervention  - Repeat CTH in 6 hrs. If stable and Labs WNL no further imaging or intervention required  - Keppra 500 BID for 7 days  - Thoracic imaging for pain on palpation  - Patient should follow up with Dr. Davenport in clinic in 2 weeks. No Ac or AP until then MARQUEZELISSA MASSEY  40y presents with headache after a fall down steps on sunday after tripping over wires. +LOC. Not on AC/AP. No shaking like episode, tongue biting. Has nausea, posterior headache and neck pain, and upper thoracic pain. No radiculopathy, weakness or numbness, no vision changes.   Imaging: small frontal interhemispheric tSAH, CT c spine neg  Exam: intact, has bruising in upper thoracic spine with pain on palpation  - No acute neurosurgical intervention  -C spine neg, can clear w/ confrontation.  - Repeat CTH in 6 hrs. If stable and Labs WNL no further imaging or intervention required  - Keppra 500 BID for 7 days  - Thoracic imaging for pain on palpation  - Patient should follow up with Dr. Davenport in clinic in 2 weeks. No Ac or AP until then

## 2020-08-05 NOTE — ED ADULT NURSE REASSESSMENT NOTE - NS ED NURSE REASSESS COMMENT FT1
Call received from radiology with results that pt has a subarachnoid hemorrhage, MD Blanco notified. Pt neurologically intact, 4mm PERRLA, strength equal in all extremities, follows commands without difficulty, AOx4.

## 2020-08-05 NOTE — CONSULT NOTE ADULT - ASSESSMENT
Patient is a 40 year old female with no past medical history and past surgical history of an appendectomy and hernia repair, presents to [facility] ED after falling down 7 flights of stairs on 8/2/20. CT head positive for subarachnoid hemorrhage.    - Await read on CT chest abdomen pelvis  - Neurosurgery recommendations for subarachnoid  - Discussed with attending Patient is a 40 year old female with no past medical history and past surgical history of an appendectomy and hernia repair, presents to [facility] ED after falling down 7 flights of stairs on 8/2/20. CT head positive for subarachnoid hemorrhage.    - Await read on CT chest abdomen pelvis  - Neurosurgery recommendations for subarachnoid  - Discussed with attending     [id number] Patient is a 40 year old female with no past medical history and past surgical history of an appendectomy and hernia repair, presents to [facility] ED after falling down 7 flights of stairs on 8/2/20. CT head positive for subarachnoid hemorrhage.    - CT chest abdomen pelvis significant for age indeterminant fractures of left 3-4th ribs. - rec incentive spirometer and pain control.   - Neurosurgery recommendations for subarachnoid - stable on repeat CTH - no further workup per NSGY  - Discussed with attending     [id number]

## 2020-08-19 ENCOUNTER — APPOINTMENT (OUTPATIENT)
Dept: NEUROSURGERY | Facility: CLINIC | Age: 40
End: 2020-08-19
Payer: SELF-PAY

## 2020-08-19 VITALS
OXYGEN SATURATION: 98 % | DIASTOLIC BLOOD PRESSURE: 75 MMHG | RESPIRATION RATE: 16 BRPM | HEART RATE: 74 BPM | TEMPERATURE: 96.4 F | SYSTOLIC BLOOD PRESSURE: 121 MMHG | HEIGHT: 58 IN | BODY MASS INDEX: 33.17 KG/M2 | WEIGHT: 158 LBS

## 2020-08-19 DIAGNOSIS — S06.5X9A TRAUMATIC SUBDURAL HEMORRHAGE WITH LOSS OF CONSCIOUSNESS OF UNSPECIFIED DURATION, INITIAL ENCOUNTER: ICD-10-CM

## 2020-08-19 DIAGNOSIS — Z78.9 OTHER SPECIFIED HEALTH STATUS: ICD-10-CM

## 2020-08-19 PROCEDURE — 99203 OFFICE O/P NEW LOW 30 MIN: CPT

## 2020-08-24 NOTE — ASSESSMENT
[FreeTextEntry1] : 40 years old female with h/o fall with CT scan evidence of small SAH which was stable in the repeated scan. Pt feeling well with no complaints and neurological problems.Will repeat imaging with follow up.\par \par Plan;\par \par MRI head with no contrast in 2 weeks\par Precautions given for  prevention of fall\par Warning signs to immediate follow up advised\par Will follow up in 3 weeks \par

## 2020-08-24 NOTE — REVIEW OF SYSTEMS
[Negative] : Gastrointestinal [As Noted in HPI] : as noted in HPI [Memory Lapses or Loss] : no memory loss [Decr. Concentrating Ability] : no decrease in concentrating ability [de-identified] : nausea

## 2020-08-24 NOTE — REASON FOR VISIT
[New Patient Visit] : a new patient visit [[employer] ] : provided by [employer]   [FreeTextEntry1] : [id number] [FreeTextEntry2] : Marin [TWNoteComboBox1] : Chinese

## 2020-08-24 NOTE — PHYSICAL EXAM
[General Appearance - In No Acute Distress] : in no acute distress [General Appearance - Well Nourished] : well nourished [General Appearance - Alert] : alert [Impaired Insight] : insight and judgment were intact [General Appearance - Well-Appearing] : healthy appearing [Oriented To Time, Place, And Person] : oriented to person, place, and time [Affect] : the affect was normal [Memory Recent] : recent memory was not impaired [Person] : oriented to person [Place] : oriented to place [Short Term Intact] : short term memory intact [Time] : oriented to time [Cranial Nerves Optic (II)] : visual acuity intact bilaterally,  pupils equal round and reactive to light [Cranial Nerves Oculomotor (III)] : extraocular motion intact [Cranial Nerves Facial (VII)] : face symmetrical [Cranial Nerves Vestibulocochlear (VIII)] : hearing was intact bilaterally [Cranial Nerves Trigeminal (V)] : facial sensation intact symmetrically [Balance] : balance was intact [Cranial Nerves Accessory (XI - Cranial And Spinal)] : head turning and shoulder shrug symmetric [Sclera] : the sclera and conjunctiva were normal [Neck Appearance] : the appearance of the neck was normal [Outer Ear] : the ears and nose were normal in appearance [] : no respiratory distress [No Spinal Tenderness] : no spinal tenderness [Heart Rate And Rhythm] : heart rate was normal and rhythm regular [Abnormal Walk] : normal gait [Involuntary Movements] : no involuntary movements were seen [No Visual Abnormalities] : no visible abnormalities [Normal Lordosis] : normal lordosis [FreeTextEntry8] : no pronator drift [Pain] : was painless [Restricted] : was not restricted

## 2020-08-24 NOTE — RESULTS/DATA
[FreeTextEntry1] : 8/4/2020 [facility]:  small amt of anterior interhemispheric TSAH.  Was stable on follow up examination.

## 2020-08-24 NOTE — HISTORY OF PRESENT ILLNESS
[de-identified] : 40F with PMH/PSH including s/p appendectomy, s/p cholecystectomy presents to the ED with pain s/p fall down steps on 8/4/2020,  Reports was cleaning a home on\par  when her feet got tangled on the vacuum cord and she fell backwards down about 7 steps. Reports that initially after fall hit head and had LOC. Denies\par seizure. Denies loss of urinary or bowel continence. Denies change in vision,double vision, sudden loss of vision, loss of hearing, tinnitus. Reports\par generalized pain, but also neck pain, back pain, L sided chest pain, L buttock pain, LUE pain. Denies shortness of breath, cough, known COVID or COVID\par contacts. Denies abdominal pain, diarrhea, blood in stools. Report has had nausea since fall but reports much improved from the first day. Reports that\par after the fall was so nauseated that she had to sleep sitting up for fear of emesis with laying down. Denies numbness, weakness or tingling in extremities.\par \par Today Ms Chavez present ambulatory believes that she improved over all. [employer]  service used for translation. Denies headache, speech impairment, vision problems or seizures.  However she feels nauseated sometime especially when she moves from side to side.  She also reported neck pain and was feeling some pressure in the back of the neck. \par \par

## 2020-09-13 NOTE — HISTORY OF PRESENT ILLNESS
[FreeTextEntry1] : 40F with no significnt medical illness,  presents to the ED with pain s/p fall down steps on 8/4/2020, Reports was cleaning a home on  when her feet got tangled on the vacuum cord and she fell backwards down about 7 steps. Reports that initially after fall hit head and had LOC. Denies seizure. Denies loss of urinary or bowel continence. Denies change in vision,double vision, sudden loss of vision, loss of hearing, tinnitus. Reports generalized pain, but also neck pain, back pain, L sided chest pain, L buttock pain, LUE pain. .Report has had nausea since fall but reports much improved from the first day. Reports that after the fall was so nauseated that she had to sleep sitting up for fear of emesis with laying down. Denies numbness, weakness or tingling in extremities.Repeated images showed stable SDH. Last visit in this office was on 8/19/20 and advised to repeat MRI head.

## 2020-09-14 ENCOUNTER — APPOINTMENT (OUTPATIENT)
Dept: NEUROSURGERY | Facility: CLINIC | Age: 40
End: 2020-09-14

## 2020-09-18 ENCOUNTER — APPOINTMENT (OUTPATIENT)
Dept: NEUROSURGERY | Facility: CLINIC | Age: 40
End: 2020-09-18

## 2020-12-15 PROBLEM — N39.0 COMPLICATED UTI (URINARY TRACT INFECTION): Status: RESOLVED | Noted: 2017-01-31 | Resolved: 2020-12-15

## 2021-08-26 NOTE — H&P ADULT. - HISTORY OF PRESENT ILLNESS
39 y/o female with no significant PMH or PSH presents with 4 days of acute onsent RUQ, RLQ and Right Flank pain. The pain has been constant and on occasion radiates to her back. She has never had pain like this before and denies any alleviating or aggravating symptoms She notes that the pain is associated with nausea and vomiting, as well as fevers, and chills. ROS is significant for dysuria.     +UA in ED. CT scan obtained showed findings significant for signs of acute cholecystitis and appendicitis. Partial Purse String (Simple) Text: Given the location of the defect and the characteristics of the surrounding skin a simple purse string closure was deemed most appropriate.  Undermining was performed circumfirentially around the surgical defect.  A purse string suture was then placed and tightened. Wound tension only allowed a partial closure of the circular defect.

## 2022-11-07 ENCOUNTER — APPOINTMENT (OUTPATIENT)
Dept: INTERNAL MEDICINE | Facility: CLINIC | Age: 42
End: 2022-11-07

## 2022-12-12 ENCOUNTER — APPOINTMENT (OUTPATIENT)
Dept: INTERNAL MEDICINE | Facility: CLINIC | Age: 42
End: 2022-12-12

## 2023-01-09 ENCOUNTER — APPOINTMENT (OUTPATIENT)
Dept: INTERNAL MEDICINE | Facility: CLINIC | Age: 43
End: 2023-01-09
Payer: COMMERCIAL

## 2023-01-09 ENCOUNTER — LABORATORY RESULT (OUTPATIENT)
Age: 43
End: 2023-01-09

## 2023-01-09 ENCOUNTER — OUTPATIENT (OUTPATIENT)
Dept: OUTPATIENT SERVICES | Facility: HOSPITAL | Age: 43
LOS: 1 days | End: 2023-01-09
Payer: SELF-PAY

## 2023-01-09 ENCOUNTER — MED ADMIN CHARGE (OUTPATIENT)
Age: 43
End: 2023-01-09

## 2023-01-09 VITALS
WEIGHT: 157 LBS | DIASTOLIC BLOOD PRESSURE: 82 MMHG | BODY MASS INDEX: 32.95 KG/M2 | HEART RATE: 84 BPM | SYSTOLIC BLOOD PRESSURE: 118 MMHG | HEIGHT: 58 IN | OXYGEN SATURATION: 96 %

## 2023-01-09 DIAGNOSIS — S06.6X9A TRAUMATIC SUBARACHNOID HEMORRHAGE WITH LOSS OF CONSCIOUSNESS OF UNSPECIFIED DURATION, INITIAL ENCOUNTER: ICD-10-CM

## 2023-01-09 DIAGNOSIS — I10 ESSENTIAL (PRIMARY) HYPERTENSION: ICD-10-CM

## 2023-01-09 DIAGNOSIS — Z98.891 HISTORY OF UTERINE SCAR FROM PREVIOUS SURGERY: Chronic | ICD-10-CM

## 2023-01-09 DIAGNOSIS — Z23 ENCOUNTER FOR IMMUNIZATION: ICD-10-CM

## 2023-01-09 DIAGNOSIS — S06.0X0A CONCUSSION W/OUT LOSS OF CONSCIOUSNESS, INITIAL ENCOUNTER: ICD-10-CM

## 2023-01-09 PROCEDURE — G0463: CPT | Mod: 25

## 2023-01-09 PROCEDURE — 87389 HIV-1 AG W/HIV-1&-2 AB AG IA: CPT

## 2023-01-09 PROCEDURE — ZZZZZ: CPT

## 2023-01-09 PROCEDURE — 84443 ASSAY THYROID STIM HORMONE: CPT

## 2023-01-09 PROCEDURE — 83036 HEMOGLOBIN GLYCOSYLATED A1C: CPT

## 2023-01-09 PROCEDURE — 80061 LIPID PANEL: CPT

## 2023-01-09 PROCEDURE — 87086 URINE CULTURE/COLONY COUNT: CPT

## 2023-01-09 PROCEDURE — T1013: CPT

## 2023-01-09 PROCEDURE — 86803 HEPATITIS C AB TEST: CPT

## 2023-01-09 PROCEDURE — 80053 COMPREHEN METABOLIC PANEL: CPT

## 2023-01-09 PROCEDURE — 85025 COMPLETE CBC W/AUTO DIFF WBC: CPT

## 2023-01-10 DIAGNOSIS — Z00.00 ENCOUNTER FOR GENERAL ADULT MEDICAL EXAMINATION WITHOUT ABNORMAL FINDINGS: ICD-10-CM

## 2023-01-10 PROBLEM — S06.0X0A CONCUSSION WITH NO LOSS OF CONSCIOUSNESS: Status: ACTIVE | Noted: 2020-08-19

## 2023-01-10 NOTE — PHYSICAL EXAM
[No Acute Distress] : no acute distress [Well Nourished] : well nourished [Well Developed] : well developed [Well-Appearing] : well-appearing [Normal Sclera/Conjunctiva] : normal sclera/conjunctiva [PERRL] : pupils equal round and reactive to light [EOMI] : extraocular movements intact [Normal Outer Ear/Nose] : the outer ears and nose were normal in appearance [Normal Oropharynx] : the oropharynx was normal [No JVD] : no jugular venous distention [No Lymphadenopathy] : no lymphadenopathy [Supple] : supple [Thyroid Normal, No Nodules] : the thyroid was normal and there were no nodules present [No Respiratory Distress] : no respiratory distress  [No Accessory Muscle Use] : no accessory muscle use [Clear to Auscultation] : lungs were clear to auscultation bilaterally [Normal Rate] : normal rate  [Regular Rhythm] : with a regular rhythm [Normal S1, S2] : normal S1 and S2 [No Murmur] : no murmur heard [No Carotid Bruits] : no carotid bruits [No Abdominal Bruit] : a ~M bruit was not heard ~T in the abdomen [No Varicosities] : no varicosities [Pedal Pulses Present] : the pedal pulses are present [No Edema] : there was no peripheral edema [No Palpable Aorta] : no palpable aorta [No Extremity Clubbing/Cyanosis] : no extremity clubbing/cyanosis [Soft] : abdomen soft [Non Tender] : non-tender [Non-distended] : non-distended [No Masses] : no abdominal mass palpated [No HSM] : no HSM [Normal Bowel Sounds] : normal bowel sounds [Normal Posterior Cervical Nodes] : no posterior cervical lymphadenopathy [Normal Anterior Cervical Nodes] : no anterior cervical lymphadenopathy [No CVA Tenderness] : no CVA  tenderness [No Spinal Tenderness] : no spinal tenderness [No Joint Swelling] : no joint swelling [Grossly Normal Strength/Tone] : grossly normal strength/tone [No Rash] : no rash [Coordination Grossly Intact] : coordination grossly intact [No Focal Deficits] : no focal deficits [Normal Gait] : normal gait [Deep Tendon Reflexes (DTR)] : deep tendon reflexes were 2+ and symmetric [Normal Affect] : the affect was normal [Normal Insight/Judgement] : insight and judgment were intact [de-identified] : lateral eyebrows with loss of hair [FreeTextEntry1] : Lavern (RN) chaperone; No external hemorrhoids, no erythema/pustules/lesions noted - some skin discoloration noted - lighter areas modesta-anal [de-identified] : L anterior axillary or pectoral node - 3x3, firm, movable, slightly ttp [de-identified] : +alopecia [de-identified] : CN II-XII intact

## 2023-01-10 NOTE — INTERPRETER SERVICES
[[employer] ] : provided by [employer]   [Time Spent: ____ minutes] : Total time spent using  services: [unfilled] minutes. The patient's primary language is not English thus required  services. [Interpreters_IDNumber] : [id number] [Interpreters_FullName] : Lizette [TWNoteComboBox1] : Albanian

## 2023-01-10 NOTE — PLAN
[FreeTextEntry1] : \par #Memory loss\par - Pt was seen by neurosurgery in 2020 after SAH, was advised to obtain MRI but pt can't remember why she never completed it\par - No acute findings on neurological exam\par - MRI re-ordered, pt given list of locations to complete, advised to f/u with neurosurgery after completed\par \par #Sujatha-anal pain/pruritis\par - Clotrimazole cream, suspected dermatitis\par - Advised to wear loose clothing, use gentle cleansers intermittently and not aggressively, allow moisture to escape/ventilate properly\par \par #Vaginal pruritus + Dysuria\par - UA + Urine culture\par - Clotrimazole cream for now\par \par #L anterior axillary / pectoral lymphadenopathy\par - Mammogram + US L breast limited\par \par #HCM\par - PHQ/AUDIT/DAST negative\par - OB/GYN referral made to establish care, cervical cancer screening\par - GI referral made for colonoscopy (seen by GI in 2017, advised to obtain colonoscopy at the time for noted colitis, likely resolved but nearing age 43 now)\par - Mammogram ordered\par - Vaxx: Tdap today\par - Labs: CBC, CMP, Lipid panel, A1C, TSH/reflex T4, HIV, HCV\par \par Return to clinic for follow-up in 5 weeks. Multiple issues ongoing - several follow-ups made today, gave patient explicit instructions for all tests but I expect pt will need assistance in completing full workup.

## 2023-01-10 NOTE — HEALTH RISK ASSESSMENT
[0] : 2) Feeling down, depressed, or hopeless: Not at all (0) [PHQ-2 Negative - No further assessment needed] : PHQ-2 Negative - No further assessment needed [None] : None [Alone] : lives alone [Employed] : employed [Single] : single [Sexually Active] : sexually active [Feels Safe at Home] : Feels safe at home [Fully functional (bathing, dressing, toileting, transferring, walking, feeding)] : Fully functional (bathing, dressing, toileting, transferring, walking, feeding) [Fully functional (using the telephone, shopping, preparing meals, housekeeping, doing laundry, using] : Fully functional and needs no help or supervision to perform IADLs (using the telephone, shopping, preparing meals, housekeeping, doing laundry, using transportation, managing medications and managing finances) [Smoke Detector] : smoke detector [Seat Belt] :  uses seat belt [Very Good] : ~his/her~  mood as very good [de-identified] : Physically active at work (cleans homes) [de-identified] : Eats out more than cooks at home [ACT2Mhrgk] : 0 [Reports changes in hearing] : Reports no changes in hearing [Reports changes in vision] : Reports no changes in vision [Guns at Home] : no guns at home [Sunscreen] : does not use sunscreen [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

## 2023-01-10 NOTE — HISTORY OF PRESENT ILLNESS
[FreeTextEntry1] : CPE [de-identified] : 42F PMH cholecystectomy, appendectomy, and SAH (2020) presents for CPE.\par \par #Memory loss\par - Pt notes having noticable memory loss daily x3 months; has to be reminded constantly re day to day tasks - keys, appointments, where things are, etc.\par - Associated with HAs intermittent non-localizing 7/10 severity x3 months\par - Denies fever/chills, vision/hearing changes, focal weakness, numbness/tingling, neck pain\par - Pt was seen by neurosurgery in 2020 after SAH, was advised to obtain MRI but pt can't remember why she never completed it\par \par #Sujatha-anal pain/pruritis\par - Pt works as a , notes anal pain and skin itching dk in cold/hot/moist environments - pain worse with BMs\par - Denies blood in stool, secretions when she wipes\par - Tried Preparation H without any relief\par \par #Vaginal pruritus\par - Similar skin sx's as above, notes redness/flaking - denies vaginal discharge or papules/bumps but notes dysuria\par

## 2023-01-10 NOTE — REVIEW OF SYSTEMS
[Dysuria] : dysuria [Itching] : Itching [Memory Loss] : memory loss [Negative] : Heme/Lymph [Vaginal Discharge] : no vaginal discharge [FreeTextEntry8] : pruritis

## 2023-01-10 NOTE — ASSESSMENT
[FreeTextEntry1] : 42F [facility] cholecystectomy, appendectomy, and SAH (2020) presents for CPE.\par

## 2023-01-11 LAB
ALBUMIN SERPL ELPH-MCNC: 4.4 G/DL
ALP BLD-CCNC: 91 U/L
ALT SERPL-CCNC: 25 U/L
ANION GAP SERPL CALC-SCNC: 11 MMOL/L
APPEARANCE: CLEAR
AST SERPL-CCNC: 22 U/L
BACTERIA UR CULT: NORMAL
BASOPHILS # BLD AUTO: 0.05 K/UL
BASOPHILS NFR BLD AUTO: 0.6 %
BILIRUB SERPL-MCNC: 0.2 MG/DL
BILIRUBIN URINE: NEGATIVE
BLOOD URINE: NEGATIVE
BUN SERPL-MCNC: 9 MG/DL
CALCIUM SERPL-MCNC: 9.7 MG/DL
CHLORIDE SERPL-SCNC: 103 MMOL/L
CHOLEST SERPL-MCNC: 231 MG/DL
CO2 SERPL-SCNC: 26 MMOL/L
COLOR: YELLOW
CREAT SERPL-MCNC: 0.71 MG/DL
EGFR: 107 ML/MIN/1.73M2
EOSINOPHIL # BLD AUTO: 0.14 K/UL
EOSINOPHIL NFR BLD AUTO: 1.6 %
ESTIMATED AVERAGE GLUCOSE: 103 MG/DL
GLUCOSE QUALITATIVE U: NEGATIVE
GLUCOSE SERPL-MCNC: 74 MG/DL
HBA1C MFR BLD HPLC: 5.2 %
HCT VFR BLD CALC: 41.2 %
HCV AB SER QL: NONREACTIVE
HCV S/CO RATIO: 0.31 S/CO
HDLC SERPL-MCNC: 55 MG/DL
HGB BLD-MCNC: 12.5 G/DL
HIV1+2 AB SPEC QL IA.RAPID: NONREACTIVE
IMM GRANULOCYTES NFR BLD AUTO: 0.3 %
KETONES URINE: NEGATIVE
LDLC SERPL CALC-MCNC: 113 MG/DL
LEUKOCYTE ESTERASE URINE: NEGATIVE
LYMPHOCYTES # BLD AUTO: 2.7 K/UL
LYMPHOCYTES NFR BLD AUTO: 30.8 %
MAN DIFF?: NORMAL
MCHC RBC-ENTMCNC: 25.1 PG
MCHC RBC-ENTMCNC: 30.3 GM/DL
MCV RBC AUTO: 82.6 FL
MONOCYTES # BLD AUTO: 0.59 K/UL
MONOCYTES NFR BLD AUTO: 6.7 %
NEUTROPHILS # BLD AUTO: 5.26 K/UL
NEUTROPHILS NFR BLD AUTO: 60 %
NITRITE URINE: NEGATIVE
NONHDLC SERPL-MCNC: 176 MG/DL
PH URINE: 7.5
PLATELET # BLD AUTO: 323 K/UL
POTASSIUM SERPL-SCNC: 4.3 MMOL/L
PROT SERPL-MCNC: 7.8 G/DL
PROTEIN URINE: NORMAL
RBC # BLD: 4.99 M/UL
RBC # FLD: 23.9 %
SODIUM SERPL-SCNC: 140 MMOL/L
SPECIFIC GRAVITY URINE: 1.02
TRIGL SERPL-MCNC: 317 MG/DL
TSH SERPL-ACNC: 1.99 UIU/ML
UROBILINOGEN URINE: NORMAL
WBC # FLD AUTO: 8.77 K/UL

## 2023-01-26 ENCOUNTER — APPOINTMENT (OUTPATIENT)
Dept: MRI IMAGING | Facility: CLINIC | Age: 43
End: 2023-01-26
Payer: COMMERCIAL

## 2023-01-26 ENCOUNTER — OUTPATIENT (OUTPATIENT)
Dept: OUTPATIENT SERVICES | Facility: HOSPITAL | Age: 43
LOS: 1 days | End: 2023-01-26
Payer: SELF-PAY

## 2023-01-26 ENCOUNTER — RESULT REVIEW (OUTPATIENT)
Age: 43
End: 2023-01-26

## 2023-01-26 DIAGNOSIS — Z00.00 ENCOUNTER FOR GENERAL ADULT MEDICAL EXAMINATION WITHOUT ABNORMAL FINDINGS: ICD-10-CM

## 2023-01-26 DIAGNOSIS — Z98.891 HISTORY OF UTERINE SCAR FROM PREVIOUS SURGERY: Chronic | ICD-10-CM

## 2023-01-26 PROCEDURE — 70551 MRI BRAIN STEM W/O DYE: CPT | Mod: 26

## 2023-01-26 PROCEDURE — 70551 MRI BRAIN STEM W/O DYE: CPT

## 2023-02-06 ENCOUNTER — APPOINTMENT (OUTPATIENT)
Dept: NEUROSURGERY | Facility: HOSPITAL | Age: 43
End: 2023-02-06

## 2023-02-13 ENCOUNTER — OUTPATIENT (OUTPATIENT)
Dept: OUTPATIENT SERVICES | Facility: HOSPITAL | Age: 43
LOS: 1 days | End: 2023-02-13
Payer: SELF-PAY

## 2023-02-13 ENCOUNTER — APPOINTMENT (OUTPATIENT)
Dept: INTERNAL MEDICINE | Facility: CLINIC | Age: 43
End: 2023-02-13
Payer: COMMERCIAL

## 2023-02-13 VITALS
WEIGHT: 164 LBS | OXYGEN SATURATION: 97 % | HEART RATE: 90 BPM | BODY MASS INDEX: 34.43 KG/M2 | SYSTOLIC BLOOD PRESSURE: 110 MMHG | HEIGHT: 58 IN | DIASTOLIC BLOOD PRESSURE: 70 MMHG

## 2023-02-13 DIAGNOSIS — I10 ESSENTIAL (PRIMARY) HYPERTENSION: ICD-10-CM

## 2023-02-13 DIAGNOSIS — R41.3 OTHER AMNESIA: ICD-10-CM

## 2023-02-13 DIAGNOSIS — Z98.891 HISTORY OF UTERINE SCAR FROM PREVIOUS SURGERY: Chronic | ICD-10-CM

## 2023-02-13 PROCEDURE — T1013: CPT

## 2023-02-13 PROCEDURE — ZZZZZ: CPT | Mod: GE

## 2023-02-13 PROCEDURE — G0463: CPT | Mod: 25

## 2023-02-15 PROBLEM — R41.3 MEMORY LOSS: Status: ACTIVE | Noted: 2023-02-15

## 2023-02-15 NOTE — ASSESSMENT
[FreeTextEntry1] : 42F PMH cholecystectomy, appendectomy, and SAH (2020) returns for f/u re her memory loss complaint and MRI.\par

## 2023-02-15 NOTE — HISTORY OF PRESENT ILLNESS
[FreeTextEntry1] : f/u - memory loss [de-identified] : 42F PMH cholecystectomy, appendectomy, and SAH (2020) returns for f/u re her memory loss complaint and MRI.\par \par #Memory loss\par - On prior CPE last month, pt noted having noticeable memory loss daily x3 months; has to be reminded constantly re day to day tasks - keys, appointments, where things are, etc.\par - Associated with HAs intermittent non-localizing 7/10 severity x3 months\par - Denies fever/chills, vision/hearing changes, focal weakness, numbness/tingling, neck pain\par - Pt was seen by neurosurgery in 2020 after SAH, was advised to obtain MRI but pt can't remember why she never completed it\par - MRI completed 1/26/23:\par \par IMPRESSION:\par 1.  No acute infarct, hemorrhage, or mass effect.\par 2.  A few foci of T2/FLAIR hyperintensity in the bilateral hemispheric white matter are nonspecific. Differential diagnosis includes inflammation, infection, demyelination, vasculopathy, migraine headaches and chronic white matter microvascular ischemic disease.\par \par - Pt was scheduled to f/u with neurosurgery but states she forgot.

## 2023-02-15 NOTE — INTERPRETER SERVICES
[[employer] ] : provided by [employer]   [Time Spent: ____ minutes] : Total time spent using  services: [unfilled] minutes. The patient's primary language is not English thus required  services. [Interpreters_IDNumber] : [id number] [Interpreters_FullName] : Tacho [TWNoteComboBox1] : Czech

## 2023-02-15 NOTE — PLAN
[FreeTextEntry1] : \par \par #Memory loss\par - Pt was seen by neurosurgery in 2020 after SAH, was advised to obtain MRI but pt can't remember why she never completed it\par - MRI completed 1/26/23 - results above, wide differential\par - Pt was scheduled to f/u with neurosurgery but states she forgot\par - Pt does not have any red flag symptoms on exam today that would warrant an urgent ED evaluation (no signs of infectious process, stroke or focal weakness/sensory change, increased ICP, weight loss, abnormal heart rhythm or sound, carotid bruits, new medications, trauma, thunderclap HA, etc.)\par - F/u neurosurgery >> advised  to assist patient with appointment due to memory issues\par \par Follow up in 5 weeks with me.

## 2023-02-16 ENCOUNTER — APPOINTMENT (OUTPATIENT)
Dept: MAMMOGRAPHY | Facility: IMAGING CENTER | Age: 43
End: 2023-02-16
Payer: COMMERCIAL

## 2023-02-16 ENCOUNTER — OUTPATIENT (OUTPATIENT)
Dept: OUTPATIENT SERVICES | Facility: HOSPITAL | Age: 43
LOS: 1 days | End: 2023-02-16
Payer: SELF-PAY

## 2023-02-16 ENCOUNTER — APPOINTMENT (OUTPATIENT)
Dept: MRI IMAGING | Facility: IMAGING CENTER | Age: 43
End: 2023-02-16
Payer: COMMERCIAL

## 2023-02-16 ENCOUNTER — RESULT REVIEW (OUTPATIENT)
Age: 43
End: 2023-02-16

## 2023-02-16 ENCOUNTER — APPOINTMENT (OUTPATIENT)
Dept: ULTRASOUND IMAGING | Facility: IMAGING CENTER | Age: 43
End: 2023-02-16
Payer: COMMERCIAL

## 2023-02-16 DIAGNOSIS — Z00.00 ENCOUNTER FOR GENERAL ADULT MEDICAL EXAMINATION WITHOUT ABNORMAL FINDINGS: ICD-10-CM

## 2023-02-16 DIAGNOSIS — Z98.891 HISTORY OF UTERINE SCAR FROM PREVIOUS SURGERY: Chronic | ICD-10-CM

## 2023-02-16 DIAGNOSIS — S06.6X9A TRAUMATIC SUBARACHNOID HEMORRHAGE WITH LOSS OF CONSCIOUSNESS OF UNSPECIFIED DURATION, INITIAL ENCOUNTER: ICD-10-CM

## 2023-02-16 DIAGNOSIS — R41.3 OTHER AMNESIA: ICD-10-CM

## 2023-02-16 PROCEDURE — 77063 BREAST TOMOSYNTHESIS BI: CPT | Mod: 26

## 2023-02-16 PROCEDURE — 76641 ULTRASOUND BREAST COMPLETE: CPT | Mod: 26,50

## 2023-02-16 PROCEDURE — 77067 SCR MAMMO BI INCL CAD: CPT | Mod: 26

## 2023-02-16 PROCEDURE — G0279: CPT

## 2023-02-16 PROCEDURE — 70551 MRI BRAIN STEM W/O DYE: CPT | Mod: 26

## 2023-02-16 PROCEDURE — 77066 DX MAMMO INCL CAD BI: CPT

## 2023-02-16 PROCEDURE — 77063 BREAST TOMOSYNTHESIS BI: CPT

## 2023-02-16 PROCEDURE — 77067 SCR MAMMO BI INCL CAD: CPT

## 2023-02-16 PROCEDURE — 70551 MRI BRAIN STEM W/O DYE: CPT

## 2023-02-16 PROCEDURE — 76641 ULTRASOUND BREAST COMPLETE: CPT

## 2023-02-28 ENCOUNTER — RESULT REVIEW (OUTPATIENT)
Age: 43
End: 2023-02-28

## 2023-02-28 ENCOUNTER — APPOINTMENT (OUTPATIENT)
Dept: MAMMOGRAPHY | Facility: IMAGING CENTER | Age: 43
End: 2023-02-28
Payer: COMMERCIAL

## 2023-02-28 ENCOUNTER — OUTPATIENT (OUTPATIENT)
Dept: OUTPATIENT SERVICES | Facility: HOSPITAL | Age: 43
LOS: 1 days | End: 2023-02-28
Payer: SELF-PAY

## 2023-02-28 ENCOUNTER — APPOINTMENT (OUTPATIENT)
Dept: ULTRASOUND IMAGING | Facility: IMAGING CENTER | Age: 43
End: 2023-02-28
Payer: COMMERCIAL

## 2023-02-28 DIAGNOSIS — Z98.891 HISTORY OF UTERINE SCAR FROM PREVIOUS SURGERY: Chronic | ICD-10-CM

## 2023-02-28 DIAGNOSIS — Z00.8 ENCOUNTER FOR OTHER GENERAL EXAMINATION: ICD-10-CM

## 2023-02-28 PROCEDURE — G0279: CPT | Mod: 26

## 2023-02-28 PROCEDURE — 77065 DX MAMMO INCL CAD UNI: CPT

## 2023-02-28 PROCEDURE — G0279: CPT

## 2023-02-28 PROCEDURE — 77065 DX MAMMO INCL CAD UNI: CPT | Mod: 26,RT

## 2023-02-28 PROCEDURE — 76642 ULTRASOUND BREAST LIMITED: CPT | Mod: 26,50

## 2023-02-28 PROCEDURE — 76642 ULTRASOUND BREAST LIMITED: CPT

## 2023-03-06 ENCOUNTER — RESULT REVIEW (OUTPATIENT)
Age: 43
End: 2023-03-06

## 2023-03-08 ENCOUNTER — APPOINTMENT (OUTPATIENT)
Dept: ULTRASOUND IMAGING | Facility: IMAGING CENTER | Age: 43
End: 2023-03-08
Payer: COMMERCIAL

## 2023-03-08 ENCOUNTER — RESULT REVIEW (OUTPATIENT)
Age: 43
End: 2023-03-08

## 2023-03-08 ENCOUNTER — OUTPATIENT (OUTPATIENT)
Dept: OUTPATIENT SERVICES | Facility: HOSPITAL | Age: 43
LOS: 1 days | End: 2023-03-08
Payer: SELF-PAY

## 2023-03-08 DIAGNOSIS — Z00.8 ENCOUNTER FOR OTHER GENERAL EXAMINATION: ICD-10-CM

## 2023-03-08 DIAGNOSIS — Z98.891 HISTORY OF UTERINE SCAR FROM PREVIOUS SURGERY: Chronic | ICD-10-CM

## 2023-03-08 PROCEDURE — 19084 BX BREAST ADD LESION US IMAG: CPT

## 2023-03-08 PROCEDURE — 77065 DX MAMMO INCL CAD UNI: CPT | Mod: 26,RT

## 2023-03-08 PROCEDURE — 88305 TISSUE EXAM BY PATHOLOGIST: CPT | Mod: 26

## 2023-03-08 PROCEDURE — 19083 BX BREAST 1ST LESION US IMAG: CPT | Mod: RT

## 2023-03-08 PROCEDURE — 19083 BX BREAST 1ST LESION US IMAG: CPT

## 2023-03-08 PROCEDURE — A4648: CPT

## 2023-03-08 PROCEDURE — 19084 BX BREAST ADD LESION US IMAG: CPT | Mod: RT

## 2023-03-08 PROCEDURE — 88305 TISSUE EXAM BY PATHOLOGIST: CPT

## 2023-03-08 PROCEDURE — 77065 DX MAMMO INCL CAD UNI: CPT

## 2023-03-10 ENCOUNTER — NON-APPOINTMENT (OUTPATIENT)
Age: 43
End: 2023-03-10

## 2023-03-16 LAB — SURGICAL PATHOLOGY STUDY: SIGNIFICANT CHANGE UP

## 2023-03-17 ENCOUNTER — NON-APPOINTMENT (OUTPATIENT)
Age: 43
End: 2023-03-17

## 2023-03-24 ENCOUNTER — NON-APPOINTMENT (OUTPATIENT)
Age: 43
End: 2023-03-24

## 2023-04-03 ENCOUNTER — APPOINTMENT (OUTPATIENT)
Dept: NEUROSURGERY | Facility: HOSPITAL | Age: 43
End: 2023-04-03

## 2023-04-13 ENCOUNTER — APPOINTMENT (OUTPATIENT)
Dept: GASTROENTEROLOGY | Facility: CLINIC | Age: 43
End: 2023-04-13

## 2023-04-18 NOTE — PATIENT PROFILE ADULT. - LIMITATIONS ON VISITORS/PHONE CALLS
Want to Say “Thank You” to a Nurse?  The SASHA Award® was created in memory of CLOVER Sheikh by his family to say thank you to bedside nurses who provide an outstanding level of care.  Submit a nomination using any method below.     OR    [email or web address]  '   none

## 2023-04-19 ENCOUNTER — LABORATORY RESULT (OUTPATIENT)
Age: 43
End: 2023-04-19

## 2023-04-19 ENCOUNTER — APPOINTMENT (OUTPATIENT)
Dept: OBGYN | Facility: CLINIC | Age: 43
End: 2023-04-19
Payer: COMMERCIAL

## 2023-04-19 ENCOUNTER — OUTPATIENT (OUTPATIENT)
Dept: OUTPATIENT SERVICES | Facility: HOSPITAL | Age: 43
LOS: 1 days | End: 2023-04-19
Payer: SELF-PAY

## 2023-04-19 VITALS — BODY MASS INDEX: 33.23 KG/M2 | SYSTOLIC BLOOD PRESSURE: 110 MMHG | WEIGHT: 159 LBS | DIASTOLIC BLOOD PRESSURE: 72 MMHG

## 2023-04-19 DIAGNOSIS — Z01.419 ENCOUNTER FOR GYNECOLOGICAL EXAMINATION (GENERAL) (ROUTINE) W/OUT ABNORMAL FINDINGS: ICD-10-CM

## 2023-04-19 DIAGNOSIS — N90.89 OTHER SPECIFIED NONINFLAMMATORY DISORDERS OF VULVA AND PERINEUM: ICD-10-CM

## 2023-04-19 DIAGNOSIS — Z00.00 ENCOUNTER FOR GENERAL ADULT MEDICAL EXAMINATION W/OUT ABNORMAL FINDINGS: ICD-10-CM

## 2023-04-19 DIAGNOSIS — Z12.11 ENCOUNTER FOR SCREENING FOR MALIGNANT NEOPLASM OF COLON: ICD-10-CM

## 2023-04-19 DIAGNOSIS — N76.0 ACUTE VAGINITIS: ICD-10-CM

## 2023-04-19 DIAGNOSIS — Z98.891 HISTORY OF UTERINE SCAR FROM PREVIOUS SURGERY: Chronic | ICD-10-CM

## 2023-04-19 PROCEDURE — 87491 CHLMYD TRACH DNA AMP PROBE: CPT

## 2023-04-19 PROCEDURE — 99386 PREV VISIT NEW AGE 40-64: CPT | Mod: GC

## 2023-04-19 PROCEDURE — 88175 CYTOPATH C/V AUTO FLUID REDO: CPT

## 2023-04-19 PROCEDURE — 87591 N.GONORRHOEAE DNA AMP PROB: CPT

## 2023-04-19 PROCEDURE — 87624 HPV HI-RISK TYP POOLED RSLT: CPT

## 2023-04-19 PROCEDURE — G0463: CPT

## 2023-04-20 LAB
C TRACH RRNA SPEC QL NAA+PROBE: SIGNIFICANT CHANGE UP
HPV HIGH+LOW RISK DNA PNL CVX: SIGNIFICANT CHANGE UP
N GONORRHOEA RRNA SPEC QL NAA+PROBE: SIGNIFICANT CHANGE UP
SPECIMEN SOURCE: SIGNIFICANT CHANGE UP

## 2023-04-21 NOTE — HISTORY OF PRESENT ILLNESS
[FreeTextEntry1] : Pt is a 42y/o  LMP 2wk ago presenting for annual GYN exam with complaint of perineal burning, itching, and flaking of skin. States these sxs have been present for 4-5mo. She has not tried any OTC creams for irritation. Reports sexual intercourse is painful.\par \par Health maintenance\par -Breast biopsy x2 benign 3/2023 -> repeat mammo/sono in 6mo (managed by PCP)\par - Overdue for pap\par - Due for colonoscopy\par \par Gyn hx: currently sexually active with 1 male partner, denies abnormal pap smears, fibroids, cysts, endometriosis, PCOS, STIs\par \par PMH: denies\par Meds: denies\par All: NKDA\par PSHx: cholecystectomy, hernia repair, tubal ligation, C/s x2, MAB s/p D&C

## 2023-04-21 NOTE — DISCUSSION/SUMMARY
[FreeTextEntry1] : Pt is a 44y/o  LMP 2wk ago presenting for annual GYN exam with complaint of perineal burning, itching, and flaking of skin for the past 4-5months. Patient with completely benign exam today, no lesions, no concern for HSV, vulvovaginal candidiasis, genital warts, lichen sclerosis.\par \par #Perineal irritation\par - Discussed benign exam findings with patient\par - Discussed use of Hydrocortisone 1% cream external use only\par \par #Health maintenance\par - Pap obtained today\par - GI referral for colonoscopy given - discussed importance\par - Breast biopsy x2 benign 3/2023 -> repeat mammo/sono in 6mo (managed by PCP)\par \par RTC in 1yr for annual GYN exam or sooner if sxs persist\par Discussed w attending, Dr. Inman\par Randy PGY1\par

## 2023-04-21 NOTE — REASON FOR VISIT
[Annual] : an annual visit. [[employer] ] : provided by [employer]   [Time Spent: ____ minutes] : Total time spent using  services: [unfilled] minutes. The patient's primary language is not English thus required  services. [Interpreters_IDNumber] : [id number] [Interpreters_FullName] : Angela [TWNoteComboBox1] : Belgian

## 2023-04-21 NOTE — PHYSICAL EXAM
[Chaperone Present] : A chaperone was present in the examining room during all aspects of the physical examination [Appropriately responsive] : appropriately responsive [Alert] : alert [No Acute Distress] : no acute distress [Oriented x3] : oriented x3 [No Lesions] : no lesions  [Labia Majora] : normal [Labia Minora] : normal [Normal] : normal [FreeTextEntry1] : no skin flaking [FreeTextEntry2] : no lesions or skin flaking [FreeTextEntry4] : no abnormal discharge [FreeTextEntry9] : no skin flaking, no genital warts, no lesions

## 2023-04-21 NOTE — REVIEW OF SYSTEMS
[Genital Rash/Irritation] : genital rash/irritation [Negative] : Heme/Lymph [Fever] : no fever [Chills] : no chills [Abn Vaginal bleeding] : no abnormal vaginal bleeding

## 2023-04-22 LAB — CYTOLOGY SPEC DOC CYTO: SIGNIFICANT CHANGE UP

## 2023-04-24 ENCOUNTER — APPOINTMENT (OUTPATIENT)
Dept: INTERNAL MEDICINE | Facility: CLINIC | Age: 43
End: 2023-04-24

## 2023-05-03 DIAGNOSIS — N90.89 OTHER SPECIFIED NONINFLAMMATORY DISORDERS OF VULVA AND PERINEUM: ICD-10-CM

## 2023-05-03 DIAGNOSIS — Z12.11 ENCOUNTER FOR SCREENING FOR MALIGNANT NEOPLASM OF COLON: ICD-10-CM

## 2023-05-03 DIAGNOSIS — Z00.00 ENCOUNTER FOR GENERAL ADULT MEDICAL EXAMINATION WITHOUT ABNORMAL FINDINGS: ICD-10-CM

## 2024-09-29 NOTE — ED PROVIDER NOTE - CHIEF COMPLAINT
Keep area clean with gentle soap and water  Take medication 3 times a day for 10 days with food  Tylenol as needed for discomfort  Follow-up for increasing pain or if fevers occur  Follow-up with PCP in 2 weeks as scheduled  
The patient is a 42y Female complaining of allergic reaction.